# Patient Record
Sex: MALE | Race: WHITE | NOT HISPANIC OR LATINO | Employment: OTHER | URBAN - METROPOLITAN AREA
[De-identification: names, ages, dates, MRNs, and addresses within clinical notes are randomized per-mention and may not be internally consistent; named-entity substitution may affect disease eponyms.]

---

## 2023-05-27 ENCOUNTER — APPOINTMENT (EMERGENCY)
Dept: RADIOLOGY | Facility: HOSPITAL | Age: 28
End: 2023-05-27

## 2023-05-27 ENCOUNTER — APPOINTMENT (EMERGENCY)
Dept: CT IMAGING | Facility: HOSPITAL | Age: 28
End: 2023-05-27

## 2023-05-27 ENCOUNTER — HOSPITAL ENCOUNTER (EMERGENCY)
Facility: HOSPITAL | Age: 28
End: 2023-05-28
Attending: EMERGENCY MEDICINE

## 2023-05-27 DIAGNOSIS — R59.1 LYMPHADENOPATHY OF HEAD AND NECK: Primary | ICD-10-CM

## 2023-05-27 LAB
ANION GAP SERPL CALCULATED.3IONS-SCNC: 9 MMOL/L (ref 4–13)
APTT PPP: 37 SECONDS (ref 23–37)
BASOPHILS # BLD AUTO: 0.02 THOUSANDS/ÂΜL (ref 0–0.1)
BASOPHILS NFR BLD AUTO: 0 % (ref 0–1)
BUN SERPL-MCNC: 19 MG/DL (ref 5–25)
CALCIUM SERPL-MCNC: 9.9 MG/DL (ref 8.4–10.2)
CHLORIDE SERPL-SCNC: 99 MMOL/L (ref 96–108)
CK SERPL-CCNC: 274 U/L (ref 39–308)
CO2 SERPL-SCNC: 28 MMOL/L (ref 21–32)
CREAT SERPL-MCNC: 1.07 MG/DL (ref 0.6–1.3)
EOSINOPHIL # BLD AUTO: 0.04 THOUSAND/ÂΜL (ref 0–0.61)
EOSINOPHIL NFR BLD AUTO: 1 % (ref 0–6)
ERYTHROCYTE [DISTWIDTH] IN BLOOD BY AUTOMATED COUNT: 13.4 % (ref 11.6–15.1)
GFR SERPL CREATININE-BSD FRML MDRD: 92 ML/MIN/1.73SQ M
GLUCOSE SERPL-MCNC: 114 MG/DL (ref 65–140)
HCT VFR BLD AUTO: 36.8 % (ref 36.5–49.3)
HGB BLD-MCNC: 12.4 G/DL (ref 12–17)
IMM GRANULOCYTES # BLD AUTO: 0.02 THOUSAND/UL (ref 0–0.2)
IMM GRANULOCYTES NFR BLD AUTO: 0 % (ref 0–2)
INR PPP: 1.1 (ref 0.84–1.19)
LYMPHOCYTES # BLD AUTO: 1.93 THOUSANDS/ÂΜL (ref 0.6–4.47)
LYMPHOCYTES NFR BLD AUTO: 25 % (ref 14–44)
MCH RBC QN AUTO: 28.2 PG (ref 26.8–34.3)
MCHC RBC AUTO-ENTMCNC: 33.7 G/DL (ref 31.4–37.4)
MCV RBC AUTO: 84 FL (ref 82–98)
MONOCYTES # BLD AUTO: 0.85 THOUSAND/ÂΜL (ref 0.17–1.22)
MONOCYTES NFR BLD AUTO: 11 % (ref 4–12)
NEUTROPHILS # BLD AUTO: 4.99 THOUSANDS/ÂΜL (ref 1.85–7.62)
NEUTS SEG NFR BLD AUTO: 63 % (ref 43–75)
NRBC BLD AUTO-RTO: 0 /100 WBCS
PLATELET # BLD AUTO: 449 THOUSANDS/UL (ref 149–390)
PMV BLD AUTO: 8.9 FL (ref 8.9–12.7)
POTASSIUM SERPL-SCNC: 3.5 MMOL/L (ref 3.5–5.3)
PROTHROMBIN TIME: 14 SECONDS (ref 11.6–14.5)
RBC # BLD AUTO: 4.39 MILLION/UL (ref 3.88–5.62)
SODIUM SERPL-SCNC: 136 MMOL/L (ref 135–147)
WBC # BLD AUTO: 7.85 THOUSAND/UL (ref 4.31–10.16)

## 2023-05-27 RX ADMIN — IOHEXOL 100 ML: 350 INJECTION, SOLUTION INTRAVENOUS at 22:39

## 2023-05-28 ENCOUNTER — HOSPITAL ENCOUNTER (INPATIENT)
Facility: HOSPITAL | Age: 28
LOS: 2 days | Discharge: HOME | DRG: 681 | End: 2023-05-30
Attending: PSYCHIATRY & NEUROLOGY | Admitting: PSYCHIATRY & NEUROLOGY
Payer: COMMERCIAL

## 2023-05-28 ENCOUNTER — APPOINTMENT (INPATIENT)
Dept: RADIOLOGY | Facility: HOSPITAL | Age: 28
DRG: 681 | End: 2023-05-28
Payer: COMMERCIAL

## 2023-05-28 VITALS
WEIGHT: 190 LBS | DIASTOLIC BLOOD PRESSURE: 72 MMHG | TEMPERATURE: 98.9 F | SYSTOLIC BLOOD PRESSURE: 143 MMHG | HEART RATE: 88 BPM | BODY MASS INDEX: 26.6 KG/M2 | RESPIRATION RATE: 18 BRPM | OXYGEN SATURATION: 95 % | HEIGHT: 71 IN

## 2023-05-28 DIAGNOSIS — R22.1 NECK MASS: ICD-10-CM

## 2023-05-28 LAB
AMPHETAMINES SERPL QL SCN: NEGATIVE
ANION GAP SERPL CALCULATED.3IONS-SCNC: 2 MMOL/L (ref 4–13)
ATRIAL RATE: 107 BPM
B BURGDOR IGG+IGM SER-ACNC: <0.2 AI
BARBITURATES UR QL: NEGATIVE
BASOPHILS # BLD AUTO: 0.02 THOUSANDS/ÂΜL (ref 0–0.1)
BASOPHILS NFR BLD AUTO: 0 % (ref 0–1)
BENZODIAZ UR QL: NEGATIVE
BUN SERPL-MCNC: 14 MG/DL (ref 5–25)
CALCIUM SERPL-MCNC: 9.2 MG/DL (ref 8.3–10.1)
CHLORIDE SERPL-SCNC: 104 MMOL/L (ref 96–108)
CO2 SERPL-SCNC: 28 MMOL/L (ref 21–32)
COCAINE UR QL: NEGATIVE
CREAT SERPL-MCNC: 0.86 MG/DL (ref 0.6–1.3)
EOSINOPHIL # BLD AUTO: 0.08 THOUSAND/ÂΜL (ref 0–0.61)
EOSINOPHIL NFR BLD AUTO: 1 % (ref 0–6)
ERYTHROCYTE [DISTWIDTH] IN BLOOD BY AUTOMATED COUNT: 13.5 % (ref 11.6–15.1)
ETHANOL SERPL-MCNC: <3 MG/DL (ref 0–3)
GFR SERPL CREATININE-BSD FRML MDRD: 116 ML/MIN/1.73SQ M
GLUCOSE SERPL-MCNC: 122 MG/DL (ref 65–140)
HCT VFR BLD AUTO: 33.9 % (ref 36.5–49.3)
HGB BLD-MCNC: 11.1 G/DL (ref 12–17)
HIV 1+2 AB+HIV1 P24 AG SERPL QL IA: NORMAL
HIV 2 AB SERPL QL IA: NORMAL
HIV1 AB SERPL QL IA: NORMAL
HIV1 P24 AG SERPL QL IA: NORMAL
IMM GRANULOCYTES # BLD AUTO: 0.01 THOUSAND/UL (ref 0–0.2)
IMM GRANULOCYTES NFR BLD AUTO: 0 % (ref 0–2)
LYMPHOCYTES # BLD AUTO: 1.08 THOUSANDS/ÂΜL (ref 0.6–4.47)
LYMPHOCYTES NFR BLD AUTO: 17 % (ref 14–44)
MAGNESIUM SERPL-MCNC: 2 MG/DL (ref 1.6–2.6)
MCH RBC QN AUTO: 27.6 PG (ref 26.8–34.3)
MCHC RBC AUTO-ENTMCNC: 32.7 G/DL (ref 31.4–37.4)
MCV RBC AUTO: 84 FL (ref 82–98)
METHADONE UR QL: NEGATIVE
MONOCYTES # BLD AUTO: 0.85 THOUSAND/ÂΜL (ref 0.17–1.22)
MONOCYTES NFR BLD AUTO: 13 % (ref 4–12)
NEUTROPHILS # BLD AUTO: 4.29 THOUSANDS/ÂΜL (ref 1.85–7.62)
NEUTS SEG NFR BLD AUTO: 69 % (ref 43–75)
NRBC BLD AUTO-RTO: 0 /100 WBCS
OPIATES UR QL SCN: NEGATIVE
OXYCODONE+OXYMORPHONE UR QL SCN: NEGATIVE
P AXIS: 57 DEGREES
PCP UR QL: NEGATIVE
PHOSPHATE SERPL-MCNC: 3.2 MG/DL (ref 2.7–4.5)
PLATELET # BLD AUTO: 418 THOUSANDS/UL (ref 149–390)
PMV BLD AUTO: 8.8 FL (ref 8.9–12.7)
POTASSIUM SERPL-SCNC: 3.2 MMOL/L (ref 3.5–5.3)
PR INTERVAL: 158 MS
QRS AXIS: 75 DEGREES
QRSD INTERVAL: 94 MS
QT INTERVAL: 334 MS
QTC INTERVAL: 445 MS
RBC # BLD AUTO: 4.02 MILLION/UL (ref 3.88–5.62)
SODIUM SERPL-SCNC: 134 MMOL/L (ref 135–147)
T WAVE AXIS: 26 DEGREES
T4 FREE SERPL-MCNC: 0.81 NG/DL (ref 0.61–1.12)
THC UR QL: POSITIVE
TSH SERPL DL<=0.05 MIU/L-ACNC: 5.09 UIU/ML (ref 0.45–4.5)
VENTRICULAR RATE: 107 BPM
WBC # BLD AUTO: 6.33 THOUSAND/UL (ref 4.31–10.16)

## 2023-05-28 PROCEDURE — 87040 BLOOD CULTURE FOR BACTERIA: CPT | Performed by: STUDENT IN AN ORGANIZED HEALTH CARE EDUCATION/TRAINING PROGRAM

## 2023-05-28 PROCEDURE — 84439 ASSAY OF FREE THYROXINE: CPT | Performed by: STUDENT IN AN ORGANIZED HEALTH CARE EDUCATION/TRAINING PROGRAM

## 2023-05-28 PROCEDURE — 82077 ASSAY SPEC XCP UR&BREATH IA: CPT | Performed by: PSYCHIATRY & NEUROLOGY

## 2023-05-28 PROCEDURE — 80307 DRUG TEST PRSMV CHEM ANLYZR: CPT | Performed by: PSYCHIATRY & NEUROLOGY

## 2023-05-28 PROCEDURE — 83520 IMMUNOASSAY QUANT NOS NONAB: CPT | Performed by: STUDENT IN AN ORGANIZED HEALTH CARE EDUCATION/TRAINING PROGRAM

## 2023-05-28 PROCEDURE — 99291 CRITICAL CARE FIRST HOUR: CPT | Performed by: PSYCHIATRY & NEUROLOGY

## 2023-05-28 PROCEDURE — 74177 CT ABD & PELVIS W/CONTRAST: CPT

## 2023-05-28 PROCEDURE — 83735 ASSAY OF MAGNESIUM: CPT | Performed by: PSYCHIATRY & NEUROLOGY

## 2023-05-28 PROCEDURE — 80048 BASIC METABOLIC PNL TOTAL CA: CPT | Performed by: PSYCHIATRY & NEUROLOGY

## 2023-05-28 PROCEDURE — NC001 PR NO CHARGE: Performed by: INTERNAL MEDICINE

## 2023-05-28 PROCEDURE — 85025 COMPLETE CBC W/AUTO DIFF WBC: CPT | Performed by: PSYCHIATRY & NEUROLOGY

## 2023-05-28 PROCEDURE — 84443 ASSAY THYROID STIM HORMONE: CPT | Performed by: STUDENT IN AN ORGANIZED HEALTH CARE EDUCATION/TRAINING PROGRAM

## 2023-05-28 PROCEDURE — 87389 HIV-1 AG W/HIV-1&-2 AB AG IA: CPT | Performed by: STUDENT IN AN ORGANIZED HEALTH CARE EDUCATION/TRAINING PROGRAM

## 2023-05-28 PROCEDURE — 86618 LYME DISEASE ANTIBODY: CPT | Performed by: PSYCHIATRY & NEUROLOGY

## 2023-05-28 PROCEDURE — 84100 ASSAY OF PHOSPHORUS: CPT | Performed by: PSYCHIATRY & NEUROLOGY

## 2023-05-28 PROCEDURE — G1004 CDSM NDSC: HCPCS

## 2023-05-28 PROCEDURE — 31575 DIAGNOSTIC LARYNGOSCOPY: CPT | Performed by: OTOLARYNGOLOGY

## 2023-05-28 RX ORDER — POTASSIUM CHLORIDE 14.9 MG/ML
20 INJECTION INTRAVENOUS 2 TIMES DAILY
Status: DISCONTINUED | OUTPATIENT
Start: 2023-05-28 | End: 2023-05-28

## 2023-05-28 RX ORDER — POTASSIUM CHLORIDE 20 MEQ/1
40 TABLET, EXTENDED RELEASE ORAL ONCE
Status: COMPLETED | OUTPATIENT
Start: 2023-05-28 | End: 2023-05-28

## 2023-05-28 RX ORDER — DOXYCYCLINE HYCLATE 100 MG/1
100 CAPSULE ORAL EVERY 12 HOURS SCHEDULED
Status: DISCONTINUED | OUTPATIENT
Start: 2023-05-28 | End: 2023-05-30 | Stop reason: HOSPADM

## 2023-05-28 RX ORDER — DOXYCYCLINE HYCLATE 100 MG/1
100 CAPSULE ORAL ONCE
Status: COMPLETED | OUTPATIENT
Start: 2023-05-28 | End: 2023-05-28

## 2023-05-28 RX ORDER — CHLORHEXIDINE GLUCONATE 0.12 MG/ML
15 RINSE ORAL EVERY 12 HOURS SCHEDULED
Status: DISCONTINUED | OUTPATIENT
Start: 2023-05-28 | End: 2023-05-28

## 2023-05-28 RX ORDER — POTASSIUM CHLORIDE 14.9 MG/ML
20 INJECTION INTRAVENOUS ONCE
Status: COMPLETED | OUTPATIENT
Start: 2023-05-28 | End: 2023-05-28

## 2023-05-28 RX ADMIN — DOXYCYCLINE 100 MG: 100 CAPSULE ORAL at 04:14

## 2023-05-28 RX ADMIN — POTASSIUM CHLORIDE 20 MEQ: 14.9 INJECTION, SOLUTION INTRAVENOUS at 08:23

## 2023-05-28 RX ADMIN — POTASSIUM CHLORIDE 20 MEQ: 14.9 INJECTION, SOLUTION INTRAVENOUS at 04:42

## 2023-05-28 RX ADMIN — IOHEXOL 85 ML: 350 INJECTION, SOLUTION INTRAVENOUS at 23:45

## 2023-05-28 RX ADMIN — DOXYCYCLINE 100 MG: 100 CAPSULE ORAL at 20:22

## 2023-05-28 RX ADMIN — CHLORHEXIDINE GLUCONATE 15 ML: 1.2 SOLUTION ORAL at 08:22

## 2023-05-28 RX ADMIN — POTASSIUM CHLORIDE 40 MEQ: 1500 TABLET, EXTENDED RELEASE ORAL at 15:15

## 2023-05-28 NOTE — ASSESSMENT & PLAN NOTE
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  • CT Abd/pelvis: 2 enlarged lymph nodes along aortocaval space, left external iliac chain; 1 mm right pulmonary nodule  • HIV negative, Lyme negative  • Leukemia and lymphoma flow cytometry sent     Plan  • S/p IR core needle biopsy on Tuesday 5/30; patient elects to seek outpatient follow up in [address] at the [facility] there  Patient advised to follow up with hematology/oncology there and establish care with primary care doctor there  Patient advised to return to [facility] if desired for treatment  • Lyme serologies negative - although doubt these will be positive given too early in clinical course for Ab to form  Typical doxycycline course is 14-21 days  Would doxycyline continue for 14 day course given low probability of Lyme, Rx sent on discharge for 14 day course ending 6/13/23     • Follow up Bartonella, HSV, IgE, Tryptase

## 2023-05-28 NOTE — ED NOTES
Spoke with Brant Chua to give report at [facility]       Fortunato Murguia RN  05/28/23 8552

## 2023-05-28 NOTE — H&P
H&P Exam - Critical Care   Ángela Boyle 25 y o  male MRN: [id number]  Unit/Bed#: [facility]U 204-01 Encounter: [id number]      -------------------------------------------------------------------------------------------------------------  Chief Complaint: Left sided neck swelling    History of Present Illness     Ángela Boyle is a 25 y o  male with no significant medical history presents with the complaint of left sided neck swelling  The pt states that he was camping with his mother and wife yesterday and his family noticed he had neck swelling  This was not there prior to yesterday and this the first time he has experienced this  He denies any symptoms such as SOB, voice change, or difficulty swallowing  He has no allergies  He denies bug bites  CT soft tissue neck shows Enlarged left cervical lymph node conglomerate as detailed above measuring up to 15 5 cm in craniocaudal dimension and with significant mass effect upon the surrounding soft tissue structures consistent with neoplasm, likely lymphoma  Pt is transferred to [facility] for airway watch and ENT consult  History obtained from the patient   -------------------------------------------------------------------------------------------------------------  Assessment and Plan:    Neuro:   • No acute issues       CV:   • No acute issues      Pulm:  • No acute issues     GI:   • No acute issues         :   • No acute issues       F/E/N:   • No acute issues   • NPO- perhaps ent will biposy?        Heme/Onc:   • Neck mass  o Concern for lymphoma based on CT scan       Endo:   • No acute issues   o Tsh ordered       ID:   • Was pulling ticks off of his dogs today   o Doxy prophylaxis  • Neck mass  o HIV testing  o Blood cultures        MSK/Skin:   • Neck mass  o Airway watch       Disposition: Admit to Stepdown Level 1  Code Status: Level 1 - Full Code  --------------------------------------------------------------------------------------------------------------  Review of Systems   Constitutional: Negative for chills and fever  HENT: Negative for congestion, ear pain, rhinorrhea and sore throat  Eyes: Negative for pain  Respiratory: Negative for apnea, cough, choking, chest tightness, shortness of breath, wheezing and stridor  Cardiovascular: Negative for chest pain, palpitations and leg swelling  Gastrointestinal: Negative for abdominal pain, constipation, diarrhea, nausea and vomiting  Genitourinary: Negative for hematuria  Musculoskeletal: Negative for arthralgias and back pain  Skin: Negative for rash and wound  Neurological: Negative for dizziness  Psychiatric/Behavioral: Negative for agitation and hallucinations  All other systems reviewed and are negative  A 12-point, complete review of systems was reviewed and negative except as stated above     Physical Exam  Vitals reviewed  Constitutional:       General: He is not in acute distress  Appearance: He is well-developed  HENT:      Head: Normocephalic and atraumatic  Right Ear: External ear normal       Left Ear: External ear normal       Nose: Nose normal  No congestion or rhinorrhea  Mouth/Throat:      Mouth: Mucous membranes are moist       Pharynx: No oropharyngeal exudate or posterior oropharyngeal erythema  Eyes:      General:         Right eye: No discharge  Left eye: No discharge  Extraocular Movements: Extraocular movements intact  Conjunctiva/sclera: Conjunctivae normal       Pupils: Pupils are equal, round, and reactive to light  Neck:      Comments: Left neck mass non-tender  Cardiovascular:      Rate and Rhythm: Normal rate and regular rhythm  Pulses: Normal pulses  Heart sounds: Normal heart sounds  Pulmonary:      Effort: Pulmonary effort is normal  No respiratory distress        Breath sounds: Normal breath sounds  No wheezing or rales  Abdominal:      Palpations: Abdomen is soft  Tenderness: There is no abdominal tenderness  Musculoskeletal:         General: No tenderness  Normal range of motion  Cervical back: Normal range of motion and neck supple  No rigidity or tenderness  Skin:     General: Skin is warm  Findings: No erythema or rash  Neurological:      General: No focal deficit present  Mental Status: He is alert and oriented to person, place, and time  Cranial Nerves: No cranial nerve deficit  Sensory: No sensory deficit  Motor: No weakness  Coordination: Coordination normal    Psychiatric:         Mood and Affect: Mood normal          --------------------------------------------------------------------------------------------------------------  Vitals:   Vitals:    05/28/23 0235   Temp: 97 9 °F (36 6 °C)   TempSrc: Oral     Temp  Min: 97 9 °F (36 6 °C)  Max: 98 9 °F (37 2 °C)        There is no height or weight on file to calculate BMI  PAP:      Laboratory and Diagnostics:  Results from last 7 days   Lab Units 05/27/23 2120   EOS PCT % 1   HEMATOCRIT % 36 8   HEMOGLOBIN g/dL 12 4   MONOS PCT % 11   NEUTROS PCT % 63   PLATELETS Thousands/uL 449*   WBC Thousand/uL 7 85     Results from last 7 days   Lab Units 05/27/23 2120   ANION GAP mmol/L 9   BUN mg/dL 19   CALCIUM mg/dL 9 9   CHLORIDE mmol/L 99   CO2 mmol/L 28   CREATININE mg/dL 1 07   GLUCOSE RANDOM mg/dL 114   POTASSIUM mmol/L 3 5   SODIUM mmol/L 136          Results from last 7 days   Lab Units 05/27/23 2120   INR  1 10   PTT seconds 37              ABG:    VBG:          Micro:        EKG:   Imaging: I have personally reviewed pertinent reports  Historical Information   History reviewed  No pertinent past medical history  History reviewed  No pertinent surgical history    Social History   Social History     Substance and Sexual Activity   Alcohol Use Not Currently     Social History     Substance "and Sexual Activity   Drug Use Not Currently     Social History     Tobacco Use   Smoking Status Never   Smokeless Tobacco Never     Exercise History:   Family History:   History reviewed  No pertinent family history  I have reviewed this patient's family history and commented on sigificant items within the HPI      Medications:  Current Facility-Administered Medications   Medication Dose Route Frequency   • chlorhexidine (PERIDEX) 0 12 % oral rinse 15 mL  15 mL Mouth/Throat Q12H [facility]     Home medications:  None     Allergies: Allergies   Allergen Reactions   • Tylenol [Acetaminophen] Swelling     ------------------------------------------------------------------------------------------------------------  Advance Directive and Living Will:      Power of :    POLST:    ------------------------------------------------------------------------------------------------------------  Anticipated Length of Stay is > 2 midnights        [address], DO        Portions of the record may have been created with voice recognition software  Occasional wrong word or \"sound a like\" substitutions may have occurred due to the inherent limitations of voice recognition software    Read the chart carefully and recognize, using context, where substitutions have occurred  "

## 2023-05-28 NOTE — ED PROVIDER NOTES
History  Chief Complaint   Patient presents with   • Neck Swelling     Per pt wife pt started with neck swelling on the left side of his neck less then an hour ago, that has progressivly gotten worse over the past hour  Denies difficulty breathing or swallowing  This is a 25y o   year old male  Without significant past medical history,   No prior surgeries  Denies the use of cigarettes, alcohol  + THC  That presents to the Emergency Department today with a chief complaint of sudden painless swelling L lateral neck  No trauma, no falls, no injuries of any kind  No recent illness, fever, cold symptoms  History obtained directly from the patient  History provided by:  Patient   used: No        None       History reviewed  No pertinent past medical history  History reviewed  No pertinent surgical history  History reviewed  No pertinent family history  I have reviewed and agree with the history as documented  E-Cigarette/Vaping   • E-Cigarette Use Never User      E-Cigarette/Vaping Substances   • Nicotine No    • THC No    • CBD No    • Flavoring No    • Other No    • Unknown No      Social History     Tobacco Use   • Smoking status: Never   • Smokeless tobacco: Never   Vaping Use   • Vaping Use: Never used   Substance Use Topics   • Alcohol use: Not Currently   • Drug use: Yes     Frequency: 1 0 times per week     Types: Marijuana       Review of Systems   Constitutional: Negative for chills and fever  HENT: Negative for ear pain and sore throat  Eyes: Negative for pain and visual disturbance  Respiratory: Negative for cough and shortness of breath  Cardiovascular: Negative for chest pain and palpitations  Gastrointestinal: Negative for abdominal pain and vomiting  Genitourinary: Negative for dysuria and hematuria  Musculoskeletal: Negative for arthralgias and back pain  Skin: Negative for color change and rash     Neurological: Negative for seizures and syncope  All other systems reviewed and are negative  Physical Exam  Physical Exam  Vitals (120) and nursing note reviewed  Constitutional:       General: He is not in acute distress  Appearance: Normal appearance  He is well-developed and normal weight  HENT:      Head: Normocephalic and atraumatic  Right Ear: Ear canal and external ear normal       Left Ear: Ear canal and external ear normal       Nose: Nose normal       Mouth/Throat:      Mouth: Mucous membranes are moist    Eyes:      Conjunctiva/sclera: Conjunctivae normal       Pupils: Pupils are equal, round, and reactive to light  Neck:      Comments: Soft swelling L lateral neck  From post ear all way to lower and anterior neck  Slight crepitus palpable  NON tender  No change in color of skin, no change in temperature  Cardiovascular:      Rate and Rhythm: Regular rhythm  Tachycardia present  Pulses: Normal pulses  Heart sounds: Normal heart sounds  No murmur heard  Pulmonary:      Effort: Pulmonary effort is normal  No respiratory distress  Breath sounds: Normal breath sounds  Abdominal:      Palpations: Abdomen is soft  Tenderness: There is no abdominal tenderness  Musculoskeletal:         General: No swelling  Cervical back: Neck supple  No rigidity or tenderness  Lymphadenopathy:      Cervical: No cervical adenopathy  Skin:     General: Skin is warm and dry  Capillary Refill: Capillary refill takes less than 2 seconds  Neurological:      General: No focal deficit present  Mental Status: He is alert and oriented to person, place, and time  Psychiatric:         Mood and Affect: Mood normal          Thought Content:  Thought content normal          Judgment: Judgment normal          Vital Signs  ED Triage Vitals [05/27/23 2056]   Temperature Pulse Respirations Blood Pressure SpO2   98 9 °F (37 2 °C) (!) 120 19 139/71 99 %      Temp Source Heart Rate Source Patient Position - Orthostatic VS BP Location FiO2 (%)   Oral Monitor Sitting Left arm --      Pain Score       No Pain           Vitals:    05/27/23 2056 05/27/23 2300 05/28/23 0118   BP: 139/71 129/86 143/72   Pulse: (!) 120 88 88   Patient Position - Orthostatic VS: Sitting Sitting Sitting         Visual Acuity      ED Medications  Medications   iohexol (OMNIPAQUE) 350 MG/ML injection (SINGLE-DOSE) 100 mL (100 mL Intravenous Given 5/27/23 2239)       Diagnostic Studies  Results Reviewed     Procedure Component Value Units Date/Time    CK [[id number]]  (Normal) Collected: 05/27/23 2120    Lab Status: Final result Specimen: Blood from Arm, Right Updated: 05/27/23 2201     Total  U/L     Basic metabolic panel [[id number]] Collected: 05/27/23 2120    Lab Status: Final result Specimen: Blood from Arm, Right Updated: 05/27/23 2145     Sodium 136 mmol/L      Potassium 3 5 mmol/L      Chloride 99 mmol/L      CO2 28 mmol/L      ANION GAP 9 mmol/L      BUN 19 mg/dL      Creatinine 1 07 mg/dL      Glucose 114 mg/dL      Calcium 9 9 mg/dL      eGFR 92 ml/min/1 73sq m     Narrative:      Ishmael guidelines for Chronic Kidney Disease (CKD):   •  Stage 1 with normal or high GFR (GFR > 90 mL/min/1 73 square meters)  •  Stage 2 Mild CKD (GFR = 60-89 mL/min/1 73 square meters)  •  Stage 3A Moderate CKD (GFR = 45-59 mL/min/1 73 square meters)  •  Stage 3B Moderate CKD (GFR = 30-44 mL/min/1 73 square meters)  •  Stage 4 Severe CKD (GFR = 15-29 mL/min/1 73 square meters)  •  Stage 5 End Stage CKD (GFR <15 mL/min/1 73 square meters)  Note: GFR calculation is accurate only with a steady state creatinine    Protime-INR [[id number]]  (Normal) Collected: 05/27/23 2120    Lab Status: Final result Specimen: Blood from Arm, Right Updated: 05/27/23 2141     Protime 14 0 seconds      INR 1 10    APTT [[id number]]  (Normal) Collected: 05/27/23 2120    Lab Status: Final result Specimen: Blood from Arm, Right Updated: 05/27/23 2141 PTT 37 seconds     CBC and differential [[id number]]  (Abnormal) Collected: 05/27/23 2120    Lab Status: Final result Specimen: Blood from Arm, Right Updated: 05/27/23 2129     WBC 7 85 Thousand/uL      RBC 4 39 Million/uL      Hemoglobin 12 4 g/dL      Hematocrit 36 8 %      MCV 84 fL      MCH 28 2 pg      MCHC 33 7 g/dL      RDW 13 4 %      MPV 8 9 fL      Platelets 708 Thousands/uL      nRBC 0 /100 WBCs      Neutrophils Relative 63 %      Immat GRANS % 0 %      Lymphocytes Relative 25 %      Monocytes Relative 11 %      Eosinophils Relative 1 %      Basophils Relative 0 %      Neutrophils Absolute 4 99 Thousands/µL      Immature Grans Absolute 0 02 Thousand/uL      Lymphocytes Absolute 1 93 Thousands/µL      Monocytes Absolute 0 85 Thousand/µL      Eosinophils Absolute 0 04 Thousand/µL      Basophils Absolute 0 02 Thousands/µL                  CT soft tissue neck with contrast   Final Result by Merced Mace MD (05/27 2330)      Enlarged left cervical lymph node conglomerate as detailed above measuring up to 15 5 cm in craniocaudal dimension and with significant mass effect upon the surrounding soft tissue structures consistent with neoplasm, likely lymphoma  Tissue sampling    recommended         Workstation performed: [id number]         CT chest with contrast   Final Result by Merced Mace MD (05/27 2339)      Mild upper mediastinal lymphadenopathy likely related to patient's left cervical chain neoplasm  Otherwise no evidence of acute intrathoracic pathology  Cervical soft tissue findings described separately in CT cervical spine  Workstation performed: [id number]         CT chest without contrast   Final Result by Merced Mace MD (05/27 2235)      7 cm soft tissue mass deep to the left sternocleidomastoid muscle within the left supraclavicular region  This finding should be followed further with a contrast-enhanced examination  The study was marked in [facility] for immediate notification  Workstation performed: [id number]         CT soft tissue neck wo contrast   Final Result by Duarte Lemus MD (05/27 2220)      Marked bulky left cervical lymphadenopathy  The findings are nonspecific with differential considerations including malignancy/metastatic disease, lymphoma, infection, among other etiologies  Consider tissue sampling for further evaluation  The study was mildly limited by the lack of intravenous contrast       Mild displacement of the airway to the right secondary to left-sided bulky lymphadenopathy  However, there is no significant airway narrowing  Workstation performed: [id number]         XR chest 1 view portable   ED Interpretation by Destiny Pimentel MD (05/27 2139)   Radiology studies have been independently visualized by me  Preliminary interpretation: No ptx, deviation of airway to R   No free fluid  All radiology studies will be officially read by the radiologist and any discrepancies flagged and follow through the discrepancy management process to make the patient aware of significant results  Final Result by Geremias Ochoa MD (05/28 1331)      No acute cardiopulmonary disease  Workstation performed: [id number]                    Procedures  Procedures         ED Course  ED Course as of 05/29/23 0518   Sat May 27, 2023   2140 WBC: 7 85   2140 Hemoglobin: 12 4   2140 HCT: 36 8   2154 Sodium: 136   2154 Potassium: 3 5   2154 BUN: 19   2154 Creatinine: 1 07                                             Medical Decision Making  Laboratory results revealed a   WBC   Normal  HH   Normal  PLT   Normal  Kidney Function  Normal  Electrolytes  Normal       Lymphadenopathy of head and neck: acute illness or injury  Amount and/or Complexity of Data Reviewed  Labs: ordered  Decision-making details documented in ED Course  Radiology: ordered and independent interpretation performed    Discussion of management or test interpretation with external provider(s): Emergency consultations with ENT, Critical care attending  Discussion of management and transfer  Risk  Prescription drug management  Decision regarding hospitalization  Disposition  Final diagnoses:   Lymphadenopathy of head and neck     Time reflects when diagnosis was documented in both MDM as applicable and the Disposition within this note     Time User Action Codes Description Comment    5/27/2023 11:26 PM Gato Garcia Add [R59 1] Lymphadenopathy of head and neck       ED Disposition     ED Disposition   Transfer to Another Facility-In Network    Condition   --    Date/Time   Sat May 27, 2023 11:26 PM    Comment   López Arias should be transferred out to [facility] (surgical)           MD Documentation    Jack Murdock Most Recent Value   Patient Condition The patient has been stabilized such that within reasonable medical probability, no material deterioration of the patient condition or the condition of the unborn child(robyn) is likely to result from the transfer   Reason for Transfer Level of Care needed not available at this facility   Benefits of Transfer Specialized equipment and/or services available at the receiving facility (Include comment)________________________   Risks of Transfer Potential for delay in receiving treatment, Increased discomfort during transfer, Possible worsening of condition or death during transfer, Potential deterioration of medical condition, Loss of IV   Accepting Physician [address] Name, Tay Pham   Sending MD ALHAJI De La Fuente   Provider Certification General risk, such as traffic hazards, adverse weather conditions, rough terrain or turbulence, possible failure of equipment (including vehicle or aircraft), or consequences of actions of persons outside the control of the transport personnel, Risk of worsening condition, The possibility of a transport vehicle being unavailable, Unanticipated needs of medical equipment and personnel during transport, Consent was not obtained as patient is committed to psychiatric facility and transfer is mandated, The patient is stable for psychiatric transfer because they are medically stable, and is protected from harming him/herself or others during transport      RN Documentation    [address] Name, Jasmin[address]linuschristine 41  SLB   Medications Reviewed with Next Provider of Service No   Transport Mode Ambulance   Level of Care Advanced life support      Follow-up Information    None         There are no discharge medications for this patient  No discharge procedures on file      PDMP Review     None          ED Provider  Electronically Signed by           Lula Alicia MD  05/29/23 1957

## 2023-05-28 NOTE — ASSESSMENT & PLAN NOTE
· Rapidly enlarging left mass, admitted to [facility] for airway watch   · No history of B symptoms   · No axillary or groin lymphadenopathy   · No fevers, recent illness, pharyngitis       Workup:  · 5/27 CT neck w  Con: Enlarged L cervical lymph node, 15 5 cm, significant mass effect upon surrounding soft tissue structures consistent with neoplasm, likely lymphoma    · 5/27 CT chest w con: Mild upper mediastinal lymphadenopathy likely related to patient's left cervical chain neoplasm  · HIV pending     Plan  · IR core needle biopsy - Tuesday   · Would not recommend d/c home until biopsy given history of rapid progression   · Follow up HIV   · Follow up Lyme serologies - although doubt these will be positive given too early in clinical course for Ab to form   · Follow up Bartonella   · Follow up CT abd pelvis ordered

## 2023-05-28 NOTE — PLAN OF CARE
Problem: PAIN - ADULT  Goal: Verbalizes/displays adequate comfort level or baseline comfort level  Description: Interventions:  - Encourage patient to monitor pain and request assistance  - Assess pain using appropriate pain scale  - Administer analgesics based on type and severity of pain and evaluate response  - Implement non-pharmacological measures as appropriate and evaluate response  - Consider cultural and social influences on pain and pain management  - Notify physician/advanced practitioner if interventions unsuccessful or patient reports new pain  Outcome: Progressing     Problem: INFECTION - ADULT  Goal: Absence of fever/infection during neutropenic period  Description: INTERVENTIONS:  - Monitor WBC    Outcome: Progressing     Problem: SAFETY ADULT  Goal: Patient will remain free of falls  Description: INTERVENTIONS:  - Educate patient/family on patient safety including physical limitations  - Instruct patient to call for assistance with activity   - Consult OT/PT to assist with strengthening/mobility   - Keep Call bell within reach  - Keep bed low and locked with side rails adjusted as appropriate  - Keep care items and personal belongings within reach  - Initiate and maintain comfort rounds  - Make Fall Risk Sign visible to staff  - Offer Toileting every  Hours, in advance of need  - Initiate/Maintain alarm  - Obtain necessary fall risk management equipment:   - Apply yellow socks and bracelet for high fall risk patients  - Consider moving patient to room near nurses station  Outcome: Progressing

## 2023-05-28 NOTE — ED NOTES
Transfer to [facility]  Dr Lenora Perez accepting  [facility] 0100  time  Phone to call report RICARDO Sanz  05/28/23 2049

## 2023-05-28 NOTE — TELEMEDICINE
e-Consult (IPC)  - Interventional Radiology  Ruth Bernard 25 y o  male MRN: [id number]  Unit/Bed#: [facility] 204-01 Encounter: [id number]          Interventional Radiology has been consulted to evaluate Ruth Bernard    We were consulted by ICU service concerning this patient with left neck mass  Inpatient Consult to IR  Consult performed by: Johnny Walker MD  Consult ordered by: Renetta Villar DO        05/28/23    Assessment/Recommendation:   Patient is a 25year-old male with no significant past medical history, now presented to the ED due to left neck swelling  CT of the neck showed multiple enlarged lymph nodes present at the left neck  Patient has no issues with respiratory compromise as per medical records  Ultrasound-guided biopsy will be planned for Tuesday 5/30, however, can also be arranged for outpatient if patient were to be discharged  Patient does not have to be NPO for procedure  5-10 minutes, >50% of the total time devoted to medical consultative verbal/EMR discussion between providers  Written report will be generated in the EMR  Thank you for allowing Interventional Radiology to participate in the care of Ruth Bernard  Please don't hesitate to call or TigerText us with any questions       Johnny Walker MD

## 2023-05-28 NOTE — PROGRESS NOTES
"    INTERNAL MEDICINE RESIDENCY TRANSFER ACCEPTANCE NOTE     Name: Branden Dockery   Age & Sex: 25 y o  male   MRN: [id number]  Unit/Bed#: [facility] 204-01   Encounter: [id number]  Hospital Stay Days: 0    Accepting team: SOD Team C   Code Status: Level 1 - Full Code  Disposition: Patient requires Med/Surg    ASSESSMENT/PLAN     Principal Problem:    Neck mass      * Neck mass  Assessment & Plan  • Rapidly enlarging left mass, originally admitted to [facility] for airway watch   • No history of B symptoms   • No axillary or groin lymphadenopathy   • No fevers, recent illness, pharyngitis   • Transferred to Med Surg 5/28        Workup:  • 5/27 CT neck w  Con: Enlarged L cervical lymph node, 15 5 cm, significant mass effect upon surrounding soft tissue structures consistent with neoplasm, likely lymphoma  • 5/27 CT chest w con: Mild upper mediastinal lymphadenopathy likely related to patient's left cervical chain neoplasm  • HIV pending      Plan  • IR core needle biopsy - Tuesday   • Would not recommend d/c home until biopsy given history of rapid progression   • Follow up HIV   • Follow up Lyme serologies - although doubt these will be positive given too early in clinical course for Ab to form   • Follow up Bartonella   • Follow up CT abd pelvis ordered       VTE Pharmacologic Prophylaxis: Reason for no pharmacologic prophylaxis Low Risk  VTE Mechanical Prophylaxis: sequential compression device    HOSPITAL COURSE     HPI and Hospital course as per Dr Holden Po: Pool Murphy is a 25 y o  male with no significant medical history presents with the complaint of left sided neck swelling  The pt states that he was camping with his mother and wife yesterday and his family noticed he had neck swelling  This was not there prior to yesterday and this the first time he has experienced this  He denies any symptoms such as SOB, voice change, or difficulty swallowing  He has no allergies  He denies bug bites   CT soft tissue neck " "shows Enlarged left cervical lymph node conglomerate as detailed above measuring up to 15 5 cm in craniocaudal dimension and with significant mass effect upon the surrounding soft tissue structures consistent with neoplasm, likely lymphoma  He was sent to [facility] for airway watch  hes bring treated empirically with doxy bc of hx of tick exposure (dogs had ticks on them, but patient did not have tick bite) Patient denies B symptoms  no fever chills fatigue  no white count  ENT saw him  needs IR biopsy  will be done in patient tuesday  dont reccomend sending him home bc the mass was so rapidly enlarging when he presented  right now protecting airway and eating normal diet  \"    SUBJECTIVE     Patient was seen and examined at bedside  Overall the patient was feeling well  He continues to have the large swelling on his left anterior neck, however he reports that it has been unchanged since the day prior  He was told that the IR biopsy of his neck mass is planned for Tuesday  At that time he reported that he would like to go home as he does not want to stay at the hospital over the next couple of days  He was willing to sign out AMA  After a lengthy discussion regarding the risks and benefits of staying versus leaving, including the possibility of the neck mass growing further leading to respiratory compromise and death, the patient was agreeable to staying until the biopsy on Tuesday  He plans to establish care and follow-up at [facility] in [place] as it is close to his house  He also requested to speak to case management regarding obtaining medical insurance      OBJECTIVE     Vitals:    05/28/23 0533 05/28/23 0538 05/28/23 0710 05/28/23 1130   BP:  140/83 140/75 145/77   BP Location:  Right arm Right arm    Pulse:  80 98 82   Resp:  18 16    Temp:  97 9 °F (36 6 °C) 97 9 °F (36 6 °C) 98 2 °F (36 8 °C)   TempSrc:  Oral Oral Oral   SpO2:   98% 97%   Weight: 82 kg (180 lb 12 4 oz)      Height:  5' 11\" (1 803 " m)       I/O last 24 hours: In: 130 [I V :30; IV Piggyback:100]  Out: -     Physical Exam  Constitutional:       Appearance: He is well-developed  HENT:      Head: Normocephalic and atraumatic  Nose: Nose normal    Eyes:      General:         Right eye: No discharge  Left eye: No discharge  Conjunctiva/sclera: Conjunctivae normal       Pupils: Pupils are equal, round, and reactive to light  Neck:      Thyroid: No thyromegaly  Comments: Large left neck mass  Cardiovascular:      Rate and Rhythm: Normal rate and regular rhythm  Heart sounds: Normal heart sounds  No murmur heard  No friction rub  No gallop  Pulmonary:      Effort: Pulmonary effort is normal  No respiratory distress  Breath sounds: Normal breath sounds  No stridor  No wheezing or rales  Chest:      Chest wall: No tenderness  Abdominal:      General: Bowel sounds are normal  There is no distension  Palpations: Abdomen is soft  There is no mass  Tenderness: There is no abdominal tenderness  There is no guarding or rebound  Musculoskeletal:         General: No tenderness or deformity  Right lower leg: No edema  Left lower leg: No edema  Lymphadenopathy:      Cervical: No cervical adenopathy  Skin:     General: Skin is warm and dry  Neurological:      Mental Status: He is alert and oriented to person, place, and time  Psychiatric:         Mood and Affect: Mood normal          Behavior: Behavior normal          Thought Content: Thought content normal          Judgment: Judgment normal        LABORATORY DATA     Labs: I have personally reviewed pertinent reports      Results from last 7 days   Lab Units 05/28/23 0335 05/27/23 2120   EOS PCT % 1 1   HEMATOCRIT % 33 9* 36 8   HEMOGLOBIN g/dL 11 1* 12 4   MONOS PCT % 13* 11   NEUTROS PCT % 69 63   PLATELETS Thousands/uL 418* 449*   WBC Thousand/uL 6 33 7 85      Results from last 7 days   Lab Units 05/28/23 0335 05/27/23 2120   BUN "mg/dL 14 19   CALCIUM mg/dL 9 2 9 9   CHLORIDE mmol/L 104 99   CO2 mmol/L 28 28   CREATININE mg/dL 0 86 1 07   POTASSIUM mmol/L 3 2* 3 5     Results from last 7 days   Lab Units 05/28/23  0335   MAGNESIUM mg/dL 2 0     Results from last 7 days   Lab Units 05/28/23  0335   PHOSPHORUS mg/dL 3 2      Results from last 7 days   Lab Units 05/27/23  2120   INR  1 10   PTT seconds 37             Micro:  Lab Results   Component Value Date    BLOODCX Received in Microbiology Lab  Culture in Progress  05/28/2023    BLOODCX Received in Microbiology Lab  Culture in Progress  05/28/2023     IMAGING & DIAGNOSTIC TESTING     Imaging: I have personally reviewed pertinent reports  No results found  EKG, Pathology, and Other Studies: I have personally reviewed pertinent reports  ALLERGIES     Allergies   Allergen Reactions   • Tylenol [Acetaminophen] Swelling     MEDICATIONS     Current Facility-Administered Medications   Medication Dose Route Frequency Provider Last Rate   • doxycycline hyclate  100 mg Oral Q12H [facility] Karina Edwards DO     • potassium chloride  40 mEq Oral Once Wilma Valdez MD               Portions of the record may have been created with voice recognition software  Occasional wrong word or \"sound a like\" substitutions may have occurred due to the inherent limitations of voice recognition software    Read the chart carefully and recognize, using context, where substitutions have occurred     ==  Wilma Valdez MD  [address]  Internal Medicine Residency PGY-3  "

## 2023-05-28 NOTE — CONSULTS
"OTOLARYNGOLOGY CONSULT    Date of Service: 5/28/2023    Reason for consult: neck swelling     ASSESSMENT/PLAN:  Bhumika Dasilva is a 25 y o  male who we are consulted on for neck swelling    -ddx: infection vs reactive response vs malignancy  -complete doxycycline regimen for prophylactic coverage  -FU BC, HIV results  -pt has no airway/swallowing concerns at this time, may be dc home (timing per primary)  -no acute inpatient interventions per ENT  -IR biopsy for definitive dx (may be done outpatient)  -ENT outpatient FU  Pt lives in [place] and expressed desires to FU w/ [facility] upon dc    HPI  Patient is a 75-year-old male with no significant past medical history who presented to [facility] ED on 5/27 due to a few hr hx of L neck swelling  Pt was hiking w/ his wife and dog 5hr prior to presenting, the hike was uneventful and pt did not remember any insect bites and/or encounter w/ plants/leaves etc  Pt remembered seeing a few ticks on his dog, which he plucked off, but does not believe any tick got on him  After the hike, pt's wife noticed acute onset of swelling in L neck (no swelling, symmetrical neck before hike)  Pt denied any recent illnesses, has no F/C, night sweats, or recent wt changes  Pt's only known family cancer hx is paternal grandfather w/ some type of \"bone cancer  \" Pt does not smoke or drink but uses medical marijuana daily for anxiety  Pt denied dysphagia, odynophagia, dyspnea, voice changes, or neck tenderness  Pt is UTD w/ vaccinations  Pt was started on doxycycline in ED and had HIV, TSH (5 1), UDS (+ for THC), and BC x2 sent  WBC is 6 33 (7 85)      CT soft tissue neck w con (5/27/23): 15 5 x 9 5 x 7 2cm L cervical LN (level II-IV) extending from mastoid tip to sternum    CURRENT HOSPITAL MEDICATIONS  Current Facility-Administered Medications   Medication Dose Route Frequency Provider Last Rate Last Admin   • chlorhexidine (PERIDEX) 0 12 % oral rinse 15 mL  15 mL Mouth/Throat Q12H [facility] " "Aldair Call, DO   15 mL at 05/28/23 2700   • doxycycline hyclate (VIBRAMYCIN) capsule 100 mg  100 mg Oral Q12H Nate Aaron MD       • potassium chloride 20 mEq IVPB (premix)  20 mEq Intravenous Once Aníbal Jarrell MD 50 mL/hr at 05/28/23 0823 20 mEq at 05/28/23 3362       REVIEW OF SYSTEMS  As above    HISTORIES  PMH:  History reviewed  No pertinent past medical history  PSH:  History reviewed  No pertinent surgical history  SocHx:  Social History     Tobacco Use   • Smoking status: Never   • Smokeless tobacco: Never   Vaping Use   • Vaping Use: Never used   Substance Use Topics   • Alcohol use: Not Currently   • Drug use: Yes     Frequency: 1 0 times per week     Types: Marijuana       FH:  History reviewed  No pertinent family history  ALLERGIES:  Allergies   Allergen Reactions   • Tylenol [Acetaminophen] Swelling       PHYSICAL EXAM  Visit Vitals  /75 (BP Location: Right arm)   Pulse 98   Temp 97 9 °F (36 6 °C) (Oral)   Resp 16   Ht 5' 11\" (1 803 m)   Wt 82 kg (180 lb 12 4 oz)   SpO2 98%   BMI 25 21 kg/m²   Smoking Status Never   BSA 2 02 m²       General: NAD, AOx4  Eyes:  EOMI, PERRL  Ears: External ears normal in appearance  Nose: External appearance normal  Oral cavity:  No trismus, no mass/lesions, 1+ tonsils, no erythema  Neck: Trachea is midline; no thyroid nodules, large mass/swelling following L SCM visualized, not TTP, not mobile  Skin:  no external signs of bites/injuries/infection/inflammation/irritation, 2-3 unremarkable acrochordons over the L neck mass/swelling  Neuro: Motor and sensory grossly intact  Face symmetrical, no obvious cranial nerve palsies,motor and sensory grossly intact, no focal deficits     Lungs:  Normal work of breathing, symmetrical chest expansion  Vascular: Well perfused      LABORATORY  Reviewed    PROCEDURES  FIBEROPTIC LARYNGOSCOPY:  Procedure:Fiberoptic nasopharyngolaryngoscopy  Diagnosis: L neck swelling  : Dr Darryl Delgado    The risks, " benefits and alternatives were discussed  The patient voiced their understanding  They wished to proceed and an informed consent was obtained  The patient's nose was topically decongested and anesthetized using Lidocaine and oxymetazoline  The fiberoptic endoscope was inserted via the left nasal cavity  Findings listed below:  --Septal deviation towards R, normal appearing nasopharynx, fossa of Rosenmüller, oropharynx, epiglottis  BOT w/ enlargement b/l abutting the epiglottis anteriorly, no exudate, no erythema  - Vocal folds mobile bilaterally  - No Subglottic edema  - No Ventricular obliteration  - No arytenoid erythema  - Mild diffuse edema without vocal fold edema  - Mild posterior commissure edema  - No granulation  - No thick endolaryngeal mucus      RADIOLOGY  [unfilled]    There are no problems to display for this patient          Miguel Stokes MD  Otolaryngology--Head and Neck Surgery  [employer] [employer]  5/28/2023 8:41 AM

## 2023-05-28 NOTE — ED NOTES
Pt and family concerned for increased size in neck since arrival to ED  Pt denies airway compromise or difficulty breathing  Pt instructed to call if respiratory compromise, change in effort of breathing, or difficulty swallowing        Suman Lomax RN  05/27/23 5989

## 2023-05-28 NOTE — PROGRESS NOTES
Transfer Progress Note   - ICU Transfer to Step down/med  surg  Edil Shadow 25 y o  male MRN: [id number]  Unit/Bed#: [facility] 204-01 Encounter: [id number]      Code Status: Level 1 - Full Code  POA:    POLST:      Date of ICU admission: 5/28/2023    Reason for ICU adm: Neck swelling [R22 1]    Active problems:   Patient Active Problem List   Diagnosis   • Neck mass     Neck mass  Assessment & Plan  · Rapidly enlarging left mass, admitted to [facility] for airway watch   · No history of B symptoms   · No axillary or groin lymphadenopathy   · No fevers, recent illness, pharyngitis     Workup:  · 5/27 CT neck w  Con: Enlarged L cervical lymph node, 15 5 cm, significant mass effect upon surrounding soft tissue structures consistent with neoplasm, likely lymphoma  · 5/27 CT chest w con: Mild upper mediastinal lymphadenopathy likely related to patient's left cervical chain neoplasm  · HIV pending     Plan  · IR core needle biopsy - Tuesday   · Would not recommend d/c home until biopsy given history of rapid progression   · Follow up HIV   · Follow up Lyme serologies - although doubt these will be positive given too early in clinical course for Ab to form  · Can continue doxy for empirical tx of lyme   · Follow up Bartonella   · Follow up CT abd pelvis ordered   · ENT consulted - appreciate reccs       Consultants: ENT, IR     Summary of clinical course:   Sabas Suarez is a 25 y o  male with no significant medical history presents with the complaint of left sided neck swelling  The pt states that he was camping with his mother and wife yesterday and his family noticed he had neck swelling  This was not there prior to yesterday and this the first time he has experienced this  He denies any symptoms such as SOB, voice change, or difficulty swallowing  He has no allergies  He denies bug bites   CT soft tissue neck shows Enlarged left cervical lymph node conglomerate as detailed above measuring up to 15 5 cm in craniocaudal dimension and with significant mass effect upon the surrounding soft tissue structures consistent with neoplasm, likely lymphoma  He was sent to [facility] for airway watch  hes bring treated empirically with doxy bc of hx of tick exposure (dogs had ticks on them, but patient did not have tick bite) Patient denies B symptoms  no fever chills fatigue  no white count  ENT saw him  needs IR biopsy  will be done in patient tuesday  dont reccomend sending him home bc the mass was so rapidly enlarging when he presented   right now protecting airway and eating normal diet      Recent or scheduled procedures: IR core needle biopsy Tuesday 5/30    Recent diagnostics: n/a     Nutrition Plan: po regular diet     Mobilization Plan/Activity: OOB/PT

## 2023-05-28 NOTE — EMTALA/ACUTE CARE TRANSFER
[address] [place] [address] 20  [address] [place] [address]  Dept: [phone]      [facility]    NAME Terri Waldron                                         1995                              MRN [id number]    I have been informed of my rights regarding examination, treatment, and transfer   by Dr Tremayne Matos MD    Benefits: Specialized equipment and/or services available at the receiving facility (Include comment)________________________    Risks: Potential for delay in receiving treatment, Increased discomfort during transfer, Possible worsening of condition or death during transfer, Potential deterioration of medical condition, Loss of IV      Transfer Request   I acknowledge that my medical condition has been evaluated and explained to me by the emergency department physician or other qualified medical person and/or my attending physician who has recommended and offered to me further medical examination and treatment  I understand the Hospital's obligation with respect to the treatment and stabilization of my emergency medical condition  I nevertheless request to be transferred  I release the Hospital, the doctor, and any other persons caring for me from all responsibility or liability for any injury or ill effects that may result from my transfer and agree to accept all responsibility for the consequences of my choice to transfer, rather than receive stabilizing treatment at the Hospital  I understand that because the transfer is my request, my insurance may not provide reimbursement for the services  The Hospital will assist and direct me and my family in how to make arrangements for transfer, but the hospital is not liable for any fees charged by the transport service    In spite of this understanding, I refuse to consent to further medical examination and treatment which has been offered to me, and request transfer to  [place] Name, City & State : [facility]  I authorize the performance of emergency medical procedures and treatments upon me in both transit and upon arrival at the receiving facility  Additionally, I authorize the release of any and all medical records to the receiving facility and request they be transported with me, if possible  I authorize the performance of emergency medical procedures and treatments upon me in both transit and upon arrival at the receiving facility  Additionally, I authorize the release of any and all medical records to the receiving facility and request they be transported with me, if possible  I understand that the safest mode of transportation during a medical emergency is an ambulance and that the Hospital advocates the use of this mode of transport  Risks of traveling to the receiving facility by car, including absence of medical control, life sustaining equipment, such as oxygen, and medical personnel has been explained to me and I fully understand them  (ELIJAH CORRECT BOX BELOW)  [  ]  I consent to the stated transfer and to be transported by ambulance/helicopter  [  ]  I consent to the stated transfer, but refuse transportation by ambulance and accept full responsibility for my transportation by car  I understand the risks of non-ambulance transfers and I exonerate the Hospital and its staff from any deterioration in my condition that results from this refusal     X___________________________________________    DATE  23  TIME________  Signature of patient or legally responsible individual signing on patient behalf           RELATIONSHIP TO PATIENT_________________________          Provider Certification    NAME Nidhi Fonseca                                         1995                              MRN [id number]    A medical screening exam was performed on the above named patient    Based on the examination:    Condition Necessitating Transfer The encounter diagnosis was Lymphadenopathy of head and neck  Patient Condition: The patient has been stabilized such that within reasonable medical probability, no material deterioration of the patient condition or the condition of the unborn child(robyn) is likely to result from the transfer    Reason for Transfer: Level of Care needed not available at this facility    Transfer Requirements: Facility [facility]   · Space available and qualified personnel available for treatment as acknowledged by    · Agreed to accept transfer and to provide appropriate medical treatment as acknowledged by       [facility]  · Appropriate medical records of the examination and treatment of the patient are provided at the time of transfer   [facility], [address] _______  · Transfer will be performed by qualified personnel from    and appropriate transfer equipment as required, including the use of necessary and appropriate life support measures      Provider Certification: I have examined the patient and explained the following risks and benefits of being transferred/refusing transfer to the patient/family:  General risk, such as traffic hazards, adverse weather conditions, rough terrain or turbulence, possible failure of equipment (including vehicle or aircraft), or consequences of actions of persons outside the control of the transport personnel, Risk of worsening condition, The possibility of a transport vehicle being unavailable, Unanticipated needs of medical equipment and personnel during transport, Consent was not obtained as patient is committed to psychiatric facility and transfer is mandated, The patient is stable for psychiatric transfer because they are medically stable, and is protected from harming him/herself or others during transport      Based on these reasonable risks and benefits to the patient and/or the unborn child(robyn), and based upon the information available at the time of the patient’s examination, I certify that the medical benefits reasonably to be expected from the provision of appropriate medical treatments at another medical facility outweigh the increasing risks, if any, to the individual’s medical condition, and in the case of labor to the unborn child, from effecting the transfer      X____________________________________________ DATE 05/27/23        TIME_______      ORIGINAL - SEND TO MEDICAL RECORDS   COPY - SEND WITH PATIENT DURING TRANSFER

## 2023-05-29 LAB
ANION GAP SERPL CALCULATED.3IONS-SCNC: 2 MMOL/L (ref 4–13)
BUN SERPL-MCNC: 13 MG/DL (ref 5–25)
CALCIUM SERPL-MCNC: 9.5 MG/DL (ref 8.3–10.1)
CHLORIDE SERPL-SCNC: 106 MMOL/L (ref 96–108)
CO2 SERPL-SCNC: 25 MMOL/L (ref 21–32)
CREAT SERPL-MCNC: 0.98 MG/DL (ref 0.6–1.3)
ERYTHROCYTE [DISTWIDTH] IN BLOOD BY AUTOMATED COUNT: 13.5 % (ref 11.6–15.1)
GFR SERPL CREATININE-BSD FRML MDRD: 103 ML/MIN/1.73SQ M
GLUCOSE SERPL-MCNC: 106 MG/DL (ref 65–140)
HCT VFR BLD AUTO: 36.3 % (ref 36.5–49.3)
HGB BLD-MCNC: 12 G/DL (ref 12–17)
MAGNESIUM SERPL-MCNC: 2.3 MG/DL (ref 1.6–2.6)
MCH RBC QN AUTO: 27.8 PG (ref 26.8–34.3)
MCHC RBC AUTO-ENTMCNC: 33.1 G/DL (ref 31.4–37.4)
MCV RBC AUTO: 84 FL (ref 82–98)
PLATELET # BLD AUTO: 426 THOUSANDS/UL (ref 149–390)
PMV BLD AUTO: 8.9 FL (ref 8.9–12.7)
POTASSIUM SERPL-SCNC: 4.1 MMOL/L (ref 3.5–5.3)
RBC # BLD AUTO: 4.32 MILLION/UL (ref 3.88–5.62)
SODIUM SERPL-SCNC: 133 MMOL/L (ref 135–147)
WBC # BLD AUTO: 6.91 THOUSAND/UL (ref 4.31–10.16)

## 2023-05-29 PROCEDURE — 85027 COMPLETE CBC AUTOMATED: CPT | Performed by: STUDENT IN AN ORGANIZED HEALTH CARE EDUCATION/TRAINING PROGRAM

## 2023-05-29 PROCEDURE — 83735 ASSAY OF MAGNESIUM: CPT | Performed by: STUDENT IN AN ORGANIZED HEALTH CARE EDUCATION/TRAINING PROGRAM

## 2023-05-29 PROCEDURE — 80048 BASIC METABOLIC PNL TOTAL CA: CPT | Performed by: STUDENT IN AN ORGANIZED HEALTH CARE EDUCATION/TRAINING PROGRAM

## 2023-05-29 PROCEDURE — 99232 SBSQ HOSP IP/OBS MODERATE 35: CPT | Performed by: INTERNAL MEDICINE

## 2023-05-29 RX ADMIN — DOXYCYCLINE 100 MG: 100 CAPSULE ORAL at 20:57

## 2023-05-29 RX ADMIN — DOXYCYCLINE 100 MG: 100 CAPSULE ORAL at 07:47

## 2023-05-29 NOTE — PLAN OF CARE
Problem: PAIN - ADULT  Goal: Verbalizes/displays adequate comfort level or baseline comfort level  Description: Interventions:  - Encourage patient to monitor pain and request assistance  - Assess pain using appropriate pain scale  - Administer analgesics based on type and severity of pain and evaluate response  - Implement non-pharmacological measures as appropriate and evaluate response  - Consider cultural and social influences on pain and pain management  - Notify physician/advanced practitioner if interventions unsuccessful or patient reports new pain  Outcome: Progressing     Problem: SAFETY ADULT  Goal: Patient will remain free of falls  Description: INTERVENTIONS:  - Educate patient/family on patient safety including physical limitations  - Instruct patient to call for assistance with activity  - Apply yellow socks and bracelet for high fall risk patients  Outcome: Progressing

## 2023-05-29 NOTE — CASE MANAGEMENT
Case Management Assessment & Discharge Planning Note    Patient name José Miguel Ambrocio  Location [facility] 931/[facility] 931-01 MRN [id number]  : 1995 Date 2023       Current Admission Date: 2023  Current Admission Diagnosis:Neck mass   Patient Active Problem List    Diagnosis Date Noted   • Neck mass 2023      LOS (days): 1  Geometric Mean LOS (GMLOS) (days):   Days to GMLOS:     OBJECTIVE:    Risk of Unplanned Readmission Score: 6 93         Current admission status: Inpatient       Preferred Pharmacy:   [facility] [address] LUIGI KITCHEN [address] [facility], [address]  [facility] [place] [address]  Phone: [phone] Fax: [phone]    Primary Care Provider: No primary care provider on file  Primary Insurance:   Secondary Insurance:     ASSESSMENT:  Active Health Care Proxies    There are no active Health Care Proxies on file  Advance Directives  Does patient have a [address]?: No  Does patient have Advance Directives?: No              Patient Information  Admitted from[de-identified] Home  Mental Status: Alert  During Assessment patient was accompanied by: Spouse  Assessment information provided by[de-identified] Patient  Primary Caregiver: Self  Support Systems: Parent, Friend, Spouse/significant other, Family members  Home entry access options   Select all that apply : Stairs  Number of steps to enter home : 7  Do the steps have railings?: Yes  Type of Current Residence: [address] [place] [address]  Upon entering residence, is there a bedroom on the main floor (no further steps)?: No  A bedroom is located on the following floor levels of residence (select all that apply):: 3rd Floor  Upon entering residence, is there a bathroom on the main floor (no further steps)?: No  Indicate which floors of current residence have a bathroom (select all the apply):: 2nd Floor  Number of steps to 2nd floor from main floor: One Flight  Number of steps to 3rd floor from main floor: Two Flights  In the last 12 months, was there a time when you were not able to pay the mortgage or rent on time?: No  In the last 12 months, how many places have you lived?: 1  In the last 12 months, was there a time when you did not have a steady place to sleep or slept in a shelter (including now)?: No  Living Arrangements: Lives w/ Spouse/significant other    Activities of Daily Living Prior to Admission  Functional Status: Independent  Completes ADLs independently?: Yes  Ambulates independently?: Yes  Does patient use assisted devices?: No  Does patient currently own DME?: No  Does patient have a history of Outpatient Therapy (PT/OT)?: No  Does the patient have a history of Short-Term Rehab?: No  Does patient have a history of HHC?: No         Patient Information Continued  Income Source: Self-employed  Does patient have prescription coverage?: No  Within the past 12 months, you worried that your food would run out before you got the money to buy more : Never true  Within the past 12 months, the food you bought just didn't last and you didn't have money to get more : Never true  Does patient have a history of substance abuse?: No  Does patient have a history of Mental Health Diagnosis?: No         Means of Transportation  Means of Transport to Appts[de-identified] Drives Self  In the past 12 months, has lack of transportation kept you from medical appointments or from getting medications?: No  In the past 12 months, has lack of transportation kept you from meetings, work, or from getting things needed for daily living?: No        DISCHARGE DETAILS:    Discharge planning discussed with[de-identified] pt and wife  Freedom of Choice: Yes                   Contacts  Patient Contacts: wife Jacqueline Donaldson  Relationship to Patient[de-identified] Family  Contact Method: Phone  Phone Number: [phone]  Reason/Outcome: Continuity of Care, Emergency Contact, Discharge Planning              Other Referral/Resources/Interventions Provided:  Financial Resources Provided: Financial Counselor Additional Comments: PATHs for follow up-no insurance   Email sent to [employer]    CM reviewed d/c planning process including the following: identifying help at home, patient preference for d/c planning needs, Discharge Lounge, [facility] Meds to Bed program, availability of treatment team to discuss questions or concerns patient and/or family may have regarding understanding medications and recognizing signs and symptoms once discharged  CM also encouraged patient to follow up with all recommended appointments after discharge  Patient advised of importance for patient and family to participate in managing patient’s medical well being  Patient/caregiver received discharge checklist   Content reviewed  Patient/caregiver encouraged to participate in discharge plan of care prior to discharge home

## 2023-05-29 NOTE — UTILIZATION REVIEW
Initial Clinical Review    TRANSFER FROM [facility]  ED    Admission: Date/Time/Statement:   Admission Orders (From admission, onward)     Ordered        05/28/23 0237  Inpatient Admission  Once                      Orders Placed This Encounter   Procedures   • Inpatient Admission     Standing Status:   Standing     Number of Occurrences:   1     Order Specific Question:   Level of Care     Answer:   Level 1 Stepdown [13]     Order Specific Question:   Estimated length of stay     Answer:   More than 2 Midnights     Order Specific Question:   Certification     Answer:   I certify that inpatient services are medically necessary for this patient for a duration of greater than two midnights  See H&P and MD Progress Notes for additional information about the patient's course of treatment  Initial Presentation: 25 y o  male initially presented to ED at  [facility] from home with left sided neck swelling  Noticed swelling the day prior to admission while  Camping,  Not there previously  Denies any other symptoms  Or bug bites  Ct scan   Shows significant mass, likely lymphoma  Transferred to  [facility] for further care  Admit  IP with  Large left neck swelling,  Mild hyponatremia  hypokalemia and p;faith is   ENT  Consult, monitor labs, replace electrolytes, po  Antibiotics and  Malignancy work up  ENT consult  Imaging/exam  Concerning for lymphoma  Needs   IR  Guide  Bx         Plan  IR  BX   5/30       Wt Readings from Last 1 Encounters:   05/29/23 80 9 kg (178 lb 5 6 oz)     Additional Vital Signs:   98 4 °F (36 9 °C) 94 19 127/83 98 97 % -- --    05/29/23 0300 -- 60 -- -- -- 96 % -- --   05/28/23 22:10:19 98 8 °F (37 1 °C) 81 17 139/77 98 96 % None (Room air) Sitting   05/28/23 20:17:12 98 6 °F (37 °C) 84 -- 144/75 98 97 % None (Room air) --   05/28/23 1540 99 2 °F (37 3 °C) 100 -- 137/77 97 -- -- --   05/28/23 1130 98 2 °F (36 8 °C) 82 -- 145/77 99 97 % -- --   05/28/23 0710 97 9 °F (36 6 °C) 98 16 140/75 93 98 % None (Room air) Sitting   05/28/23 0538 97 9 °F (36 6 °C) 80 18 140/83 -- -- -- --   05/28/23 0235 97 9 °F (36 6 °C) -- -- -- -- -- -- --   05/28/23 0230 -- 80 -- 140/83 99 96 % --        Pertinent Labs/Diagnostic Test Results:   CT abdomen pelvis with contrast    (Results Pending)   IR biopsy neck    (Results Pending)         Results from last 7 days   Lab Units 05/29/23 0525 05/28/23 0335 05/27/23 2120   HEMATOCRIT % 36 3* 33 9* 36 8   HEMOGLOBIN g/dL 12 0 11 1* 12 4   NEUTROS ABS Thousands/µL  --  4 29 4 99   PLATELETS Thousands/uL 426* 418* 449*   WBC Thousand/uL 6 91 6 33 7 85         Results from last 7 days   Lab Units 05/29/23 0525 05/28/23 0335 05/27/23 2120   ANION GAP mmol/L 2* 2* 9   BUN mg/dL 13 14 19   CALCIUM mg/dL 9 5 9 2 9 9   CHLORIDE mmol/L 106 104 99   CO2 mmol/L 25 28 28   CREATININE mg/dL 0 98 0 86 1 07   EGFR ml/min/1 73sq m 103 116 92   POTASSIUM mmol/L 4 1 3 2* 3 5   MAGNESIUM mg/dL 2 3 2 0  --    PHOSPHORUS mg/dL  --  3 2  --    SODIUM mmol/L 133* 134* 136             Results from last 7 days   Lab Units 05/29/23 0525 05/28/23 0335 05/27/23 2120   GLUCOSE RANDOM mg/dL 106 122 114               Results from last 7 days   Lab Units 05/27/23 2120   CK TOTAL U/L 274             Results from last 7 days   Lab Units 05/27/23 2120   INR  1 10   PROTIME seconds 14 0   PTT seconds 37     Results from last 7 days   Lab Units 05/28/23 0335   TSH 3RD GENERATON uIU/mL 5 090*               Results from last 7 days   Lab Units 05/28/23 0353   AMPH/METH  Negative   BARBITURATE UR  Negative   BENZODIAZEPINE UR  Negative   COCAINE UR  Negative   METHADONE URINE  Negative   OPIATE UR  Negative   PCP UR  Negative   THC UR  Positive*     Results from last 7 days   Lab Units 05/28/23 0335   ETHANOL LVL mg/dL <3                 Results from last 7 days   Lab Units 05/28/23  0336   BLOOD CULTURE  No Growth at 24 hrs  No Growth at 24 hrs  History reviewed   No pertinent past medical history  Present on Admission:  • Neck mass      Admitting Diagnosis: Neck swelling [R22 1]  Age/Sex: 25 y o  male  Admission Orders:  Scheduled Medications:  doxycycline hyclate, 100 mg, Oral, Q12H [facility]      Continuous IV Infusions:     PRN Meds:       IP CONSULT TO CASE MANAGEMENT  IP CONSULT TO ENT  INPATIENT CONSULT TO IR    Network Utilization Review Department  ATTENTION: Please call with any questions or concerns to [phone] and carefully listen to the prompts so that you are directed to the right person  All voicemails are confidential   Destinee Montejo all requests for admission clinical reviews, approved or denied determinations and any other requests to dedicated fax number below belonging to the campus where the patient is receiving treatment   List of dedicated fax numbers for the Facilities:  [address] DENIALS (Administrative/Medical Necessity) [phone]   [address] (Maternity/NICU/Pediatrics) [phone]   910 Stephanie Contreras [phone]   [facility] 77 [phone]   1306 [facility] 150 Medical [place] [place] [address] [place] [address] 28 [phone]   [address] [place] [address] [facility] 134 [address] [phone]

## 2023-05-29 NOTE — PLAN OF CARE
Problem: PAIN - ADULT  Goal: Verbalizes/displays adequate comfort level or baseline comfort level  Description: Interventions:  - Encourage patient to monitor pain and request assistance  - Assess pain using appropriate pain scale  - Administer analgesics based on type and severity of pain and evaluate response  - Implement non-pharmacological measures as appropriate and evaluate response  - Consider cultural and social influences on pain and pain management  - Notify physician/advanced practitioner if interventions unsuccessful or patient reports new pain  Outcome: Progressing     Problem: INFECTION - ADULT  Goal: Absence of fever/infection during neutropenic period  Description: INTERVENTIONS:  - Monitor WBC    Outcome: Progressing     Problem: SAFETY ADULT  Goal: Patient will remain free of falls  Description: INTERVENTIONS:  - Educate patient/family on patient safety including physical limitations  - Instruct patient to call for assistance with activity   - Consult OT/PT to assist with strengthening/mobility   - Keep Call bell within reach  - Keep bed low and locked with side rails adjusted as appropriate  - Keep care items and personal belongings within reach  - Initiate and maintain comfort rounds  - Make Fall Risk Sign visible to staff  - Offer Toileting every 2 Hours, in advance of need  - Initiate/Maintain alarm  - Obtain necessary fall risk management equipment  - Apply yellow socks and bracelet for high fall risk patients  - Consider moving patient to room near nurses station  Outcome: Progressing

## 2023-05-29 NOTE — PROGRESS NOTES
INTERNAL MEDICINE RESIDENCY PROGRESS NOTE     Name: Gerardo Palomares   Age & Sex: 25 y o  male   MRN: [id number]  Unit/Bed#: [facility] 931-01   Encounter: [id number]  Team: SOD Team C     PATIENT INFORMATION     Name: Gerardo Palomares   Age & Sex: 25 y o  male   MRN: [id number]  Hospital Stay Days: 1    ASSESSMENT/PLAN     Principal Problem:    Neck mass      * Neck mass  Assessment & Plan  • Rapidly enlarging left mass, originally admitted to [facility] for airway watch   • No history of B symptoms   • No axillary or groin lymphadenopathy   • No fevers, recent illness, pharyngitis   • Transferred to Med Surg         Workup:  •  CT neck w  Con: Enlarged L cervical lymph node, 15 5 cm, significant mass effect upon surrounding soft tissue structures consistent with neoplasm, likely lymphoma  •  CT chest w con: Mild upper mediastinal lymphadenopathy likely related to patient's left cervical chain neoplasm  • HIV pending      Plan  • IR core needle biopsy - Tuesday; does not need to be NPO prior  • Would not recommend d/c home until biopsy given history of rapid progression   • HIV negative  • Lyme serologies negative - although doubt these will be positive given too early in clinical course for Ab to form  Typical course is 14-21 days  Would continue for 14 days given low probability of Lyme  • Follow up Bartonella   • Follow up CT abd pelvis ordered       Disposition: Pending IR Biopsy     SUBJECTIVE     Patient seen and examined  No acute events overnight  Patient reports no issues breathing, speaking, or swallowing  He does not appear to be in any acute distress       OBJECTIVE     Vitals:    23 0300 23 0600 23 0657 23 0749   BP:   127/83    BP Location:       Pulse: 60  94    Resp:   19    Temp:   98 4 °F (36 9 °C)    TempSrc:       SpO2: 96%  97% 98%   Weight:  80 9 kg (178 lb 5 6 oz)     Height:          Temperature:   Temp (24hrs), Av 6 °F (37 °C), Min:98 2 °F (36 8 °C), Max:99 2 °F (37 3 °C)    Temperature: 98 4 °F (36 9 °C)  Intake & Output:  I/O       05/27 0701 05/28 0700 05/28 0701 05/29 0700 05/29 0701 05/30 0700    I V  (mL/kg) 30 (0 4) 35 (0 4)     IV Piggyback 100 100     Total Intake(mL/kg) 130 (1 6) 135 (1 7)     Urine (mL/kg/hr)  400 (0 2)     Stool  0     Total Output  400     Net +130 -265            Unmeasured Stool Occurrence  0 x         Weights:   IBW (Ideal Body Weight): 75 3 kg    Body mass index is 24 88 kg/m²  Weight (last 2 days)     Date/Time Weight    05/29/23 0600 80 9 (178 35)    05/28/23 0533 82 (180 78)    05/28/23 0532 82 (180 78)    05/28/23 0238 82 4 (181 7)        Physical Exam  Constitutional:       Appearance: He is well-developed  HENT:      Head: Normocephalic and atraumatic  Nose: Nose normal    Eyes:      Extraocular Movements: Extraocular movements intact  Conjunctiva/sclera: Conjunctivae normal    Neck:      Comments: Large left neck mass  Cardiovascular:      Rate and Rhythm: Normal rate  Pulmonary:      Effort: Pulmonary effort is normal  No respiratory distress  Abdominal:      Palpations: Abdomen is soft  Tenderness: There is no abdominal tenderness  Skin:     General: Skin is warm and dry  Neurological:      Mental Status: He is alert and oriented to person, place, and time  Psychiatric:         Mood and Affect: Mood normal          Behavior: Behavior normal        LABORATORY DATA     Labs: I have personally reviewed pertinent reports    Results from last 7 days   Lab Units 05/29/23 0525 05/28/23 0335 05/27/23 2120   EOS PCT %  --  1 1   HEMATOCRIT % 36 3* 33 9* 36 8   HEMOGLOBIN g/dL 12 0 11 1* 12 4   MONOS PCT %  --  13* 11   NEUTROS PCT %  --  69 63   PLATELETS Thousands/uL 426* 418* 449*   WBC Thousand/uL 6 91 6 33 7 85      Results from last 7 days   Lab Units 05/29/23 0525 05/28/23 0335 05/27/23 2120   BUN mg/dL 13 14 19   CALCIUM mg/dL 9 5 9 2 9 9   CHLORIDE mmol/L 106 104 99   CO2 mmol/L 25 28 "28   CREATININE mg/dL 0 98 0 86 1 07   POTASSIUM mmol/L 4 1 3 2* 3 5     Results from last 7 days   Lab Units 05/29/23  0525 05/28/23  0335   MAGNESIUM mg/dL 2 3 2 0     Results from last 7 days   Lab Units 05/28/23  0335   PHOSPHORUS mg/dL 3 2      Results from last 7 days   Lab Units 05/27/23  2120   INR  1 10   PTT seconds 37               IMAGING & DIAGNOSTIC TESTING     Radiology Results: I have personally reviewed pertinent reports  No results found  Other Diagnostic Testing: I have personally reviewed pertinent reports  ACTIVE MEDICATIONS     Current Facility-Administered Medications   Medication Dose Route Frequency   • doxycycline hyclate (VIBRAMYCIN) capsule 100 mg  100 mg Oral Q12H [address]       VTE Pharmacologic Prophylaxis: Reason for no pharmacologic prophylaxis ambulatory  VTE Mechanical Prophylaxis: sequential compression device    Portions of the record may have been created with voice recognition software  Occasional wrong word or \"sound a like\" substitutions may have occurred due to the inherent limitations of voice recognition software    Read the chart carefully and recognize, using context, where substitutions have occurred   ==  Franco Davenport, [address] Azra Saunders MD  [address]  Internal Medicine Residency PGY-1       "

## 2023-05-29 NOTE — CASE MANAGEMENT
Case Management Discharge Planning Note    Patient name Danni Crooks  Location [facility] 931/[facility] 931-01 MRN [id number]  : 1995 Date 2023       Current Admission Date: 2023  Current Admission Diagnosis:Neck mass   Patient Active Problem List    Diagnosis Date Noted   • Neck mass 2023      LOS (days): 1  Geometric Mean LOS (GMLOS) (days):   Days to GMLOS:     OBJECTIVE:  Risk of Unplanned Readmission Score: 6 93         Current admission status: Inpatient   Preferred Pharmacy:   [facility] [address] LUIGI KITCHEN [address] [facility]  [facility] [address]  Phone: [phone] Fax: [phone]    Primary Care Provider: No primary care provider on file  Primary Insurance:   Secondary Insurance:     DISCHARGE DETAILS:    CM consult-pt does not have insurance    PATHS will follow up with pt for MA melissa

## 2023-05-29 NOTE — DISCHARGE SUMMARY
"      INTERNAL MEDICINE RESIDENCY DISCHARGE SUMMARY     Renu Fritz   25 y o  male  MRN: [id number]  Room/Bed: [facility] 931/[facility] 931-01     [address] [address]   Encounter: [id number]    Principal Problem:    Neck mass      * Neck mass  Assessment & Plan  Rapidly enlarging left mass, originally admitted to [facility] for airway watch   No history of B symptoms   No axillary or groin lymphadenopathy   No fevers, recent illness, pharyngitis   Transferred to Med Surg 5/28     Workup:  5/27 CT neck w  Con: Enlarged L cervical lymph node, 15 5 cm, significant mass effect upon surrounding soft tissue structures consistent with neoplasm, likely lymphoma  5/27 CT chest w con: Mild upper mediastinal lymphadenopathy likely related to patient's left cervical chain neoplasm  CT Abd/pelvis: 2 enlarged lymph nodes along aortocaval space, left external iliac chain; 1 mm right pulmonary nodule  HIV negative, Lyme negative  Leukemia and lymphoma flow cytometry sent     Plan  S/p IR core needle biopsy on Tuesday 5/30; patient elects to seek outpatient follow up in [address] [facility] at the [place] [address] there  Patient advised to follow up with hematology/oncology there and establish care with primary care doctor there  Patient advised to return to [facility] if desired for treatment  Lyme serologies negative - although doubt these will be positive given too early in clinical course for Ab to form  Typical doxycycline course is 14-21 days  Would doxycyline continue for 14 day course given low probability of Lyme, Rx sent on discharge for 14 day course ending 6/13/23  Follow up Bartonella, HSV, IgE, Tryptase      DETAILS OF HOSPITAL COURSE     Per initial H&P:  \"Codie Smith is a 25 y o  male with no significant medical history presents with the complaint of left sided neck swelling   The pt states that he was camping with his mother and wife yesterday and his family noticed he had neck " "swelling  This was not there prior to yesterday and this the first time he has experienced this  He denies any symptoms such as SOB, voice change, or difficulty swallowing  He has no allergies  He denies bug bites  CT soft tissue neck shows Enlarged left cervical lymph node conglomerate as detailed above measuring up to 15 5 cm in craniocaudal dimension and with significant mass effect upon the surrounding soft tissue structures consistent with neoplasm, likely lymphoma  Pt is transferred to [facility] for airway watch and ENT consult  \"    Remainder of hospital course:  No acute events during the hospitalization, patient maintained patent airway and with no reports of dysphagia  Patient is status post IR neck mass biopsy on 5/30  Leukemia and lymphoma flow cytometry sent  Patient wishes to follow-up with [facility] for review of results  Patient advised to follow-up with PCP within 1 to 2 weeks of discharge  Patient advised to follow-up with medical oncology at [facility] (patient preference) or at [place] [address]  Patient discharged on PO doxycycline to finish 14 day course  Patient advised to return to the emergency department if he experiences any shortness of breath or dysphagia, patient acknowledged and agreed to do this       DISCHARGE INFORMATION     Vitals at Discharge:  Visit Vitals  /85   Pulse 71   Temp 98 1 °F (36 7 °C)   Resp 18   Ht 5' 11\" (1 803 m)   Wt 80 2 kg (176 lb 12 9 oz)   SpO2 97%   BMI 24 66 kg/m²   Smoking Status Never   BSA 2 m²       Physical Exam at Discharge:  General: No apparent distress, resting comfortably   Head: Normocephalic, atraumatic  Eyes: Anicteric, no conjunctival erythema  ENT: External ear normal, no nasal discharge, large neck mass  Respiratory: Non-labored respirations, symmetric thorax expansion  Cardiovascular: Extremities appear well-perfused  Abdomen: Non-distended  Extremities: Moves extremities spontaneously, no peripheral edema  Neuro: A&O x 3, " "no gross focal deficits, no aphasia    PCP at Discharge: No PCP on file    Admitting Provider: Melonie Stokes MD  Admission Date: 5/28/2023    Discharge Provider: Glory Acosta MD  Discharge Date: 5/30/2023    Discharge Disposition: Home  Discharge Condition: good  Discharge with Lines: no    Discharge Diet: regular diet  Activity Restrictions: none  Test Results Pending at Discharge: HSV, Bartonella, IgE, tryptase    Discharge Diagnoses:  Principal Problem:    Neck mass  Resolved Problems:    * No resolved hospital problems  *      Consulting Providers:  ENT, ICU    Diagnostic & Therapeutic Procedures Performed:  No results found  Code Status: Level 1 - Full Code  Advance Directive & Living Will: <no information>  Power of :    POLST:      Medications:  Doxycycline for full 14-day course ending on 6/13/2023    Allergies: Allergies   Allergen Reactions   • Tylenol [Acetaminophen] Swelling       FOLLOW-UP     PCP Outpatient Follow-up:  Follow-up with PCP within 1-2 weeks after discharge  Consulting Providers Follow-up:  Follow-up with hematology oncology for review of biopsy results    Active Issues Requiring Follow-up:   IR tissue biopsy    Discharge Statement:   I spent 30 minutes minutes discharging the patient  This time was spent on the day of discharge  I had direct contact with the patient on the day of discharge  Additional documentation is required if more than 30 minutes were spent on discharge  Portions of the record may have been created with voice recognition software  Occasional wrong word or \"sound a like\" substitutions may have occurred due to the inherent limitations of voice recognition software    Read the chart carefully and recognize, using context, where substitutions have occurred     ==  Tana Moctezuma, [address]  Internal Medicine Resident PGY-1      "

## 2023-05-30 ENCOUNTER — APPOINTMENT (INPATIENT)
Dept: RADIOLOGY | Facility: HOSPITAL | Age: 28
DRG: 681 | End: 2023-05-30
Payer: COMMERCIAL

## 2023-05-30 VITALS
OXYGEN SATURATION: 97 % | HEIGHT: 71 IN | HEART RATE: 71 BPM | BODY MASS INDEX: 24.75 KG/M2 | SYSTOLIC BLOOD PRESSURE: 153 MMHG | TEMPERATURE: 98.1 F | WEIGHT: 176.81 LBS | RESPIRATION RATE: 18 BRPM | DIASTOLIC BLOOD PRESSURE: 85 MMHG

## 2023-05-30 PROCEDURE — 88360 TUMOR IMMUNOHISTOCHEM/MANUAL: CPT | Performed by: STUDENT IN AN ORGANIZED HEALTH CARE EDUCATION/TRAINING PROGRAM

## 2023-05-30 PROCEDURE — 88374 M/PHMTRC ALYS ISHQUANT/SEMIQ: CPT | Performed by: STUDENT IN AN ORGANIZED HEALTH CARE EDUCATION/TRAINING PROGRAM

## 2023-05-30 PROCEDURE — 81455 SO/HL 51/>GSAP DNA/DNA&RNA: CPT | Performed by: STUDENT IN AN ORGANIZED HEALTH CARE EDUCATION/TRAINING PROGRAM

## 2023-05-30 PROCEDURE — 88184 FLOWCYTOMETRY/ TC 1 MARKER: CPT | Performed by: INTERNAL MEDICINE

## 2023-05-30 PROCEDURE — 99024 POSTOP FOLLOW-UP VISIT: CPT | Performed by: STUDENT IN AN ORGANIZED HEALTH CARE EDUCATION/TRAINING PROGRAM

## 2023-05-30 PROCEDURE — 88312 SPECIAL STAINS GROUP 1: CPT | Performed by: STUDENT IN AN ORGANIZED HEALTH CARE EDUCATION/TRAINING PROGRAM

## 2023-05-30 PROCEDURE — 76942 ECHO GUIDE FOR BIOPSY: CPT | Performed by: STUDENT IN AN ORGANIZED HEALTH CARE EDUCATION/TRAINING PROGRAM

## 2023-05-30 PROCEDURE — 07B23ZX EXCISION OF LEFT NECK LYMPHATIC, PERCUTANEOUS APPROACH, DIAGNOSTIC: ICD-10-PCS | Performed by: STUDENT IN AN ORGANIZED HEALTH CARE EDUCATION/TRAINING PROGRAM

## 2023-05-30 PROCEDURE — 88185 FLOWCYTOMETRY/TC ADD-ON: CPT

## 2023-05-30 PROCEDURE — 88365 INSITU HYBRIDIZATION (FISH): CPT | Performed by: STUDENT IN AN ORGANIZED HEALTH CARE EDUCATION/TRAINING PROGRAM

## 2023-05-30 PROCEDURE — 20206 BIOPSY MUSCLE PERQ NEEDLE: CPT

## 2023-05-30 PROCEDURE — 38505 NEEDLE BIOPSY LYMPH NODES: CPT | Performed by: STUDENT IN AN ORGANIZED HEALTH CARE EDUCATION/TRAINING PROGRAM

## 2023-05-30 PROCEDURE — 88342 IMHCHEM/IMCYTCHM 1ST ANTB: CPT | Performed by: STUDENT IN AN ORGANIZED HEALTH CARE EDUCATION/TRAINING PROGRAM

## 2023-05-30 PROCEDURE — 99238 HOSP IP/OBS DSCHRG MGMT 30/<: CPT | Performed by: INTERNAL MEDICINE

## 2023-05-30 PROCEDURE — 76942 ECHO GUIDE FOR BIOPSY: CPT

## 2023-05-30 PROCEDURE — 88341 IMHCHEM/IMCYTCHM EA ADD ANTB: CPT | Performed by: STUDENT IN AN ORGANIZED HEALTH CARE EDUCATION/TRAINING PROGRAM

## 2023-05-30 PROCEDURE — 88307 TISSUE EXAM BY PATHOLOGIST: CPT | Performed by: STUDENT IN AN ORGANIZED HEALTH CARE EDUCATION/TRAINING PROGRAM

## 2023-05-30 RX ORDER — LIDOCAINE HYDROCHLORIDE 10 MG/ML
INJECTION, SOLUTION EPIDURAL; INFILTRATION; INTRACAUDAL; PERINEURAL AS NEEDED
Status: DISCONTINUED | OUTPATIENT
Start: 2023-05-30 | End: 2023-05-30 | Stop reason: HOSPADM

## 2023-05-30 RX ORDER — DOXYCYCLINE HYCLATE 100 MG/1
100 CAPSULE ORAL EVERY 12 HOURS SCHEDULED
Qty: 28 CAPSULE | Refills: 0 | Status: SHIPPED | OUTPATIENT
Start: 2023-05-30 | End: 2023-06-13

## 2023-05-30 RX ADMIN — LIDOCAINE HYDROCHLORIDE 10 ML: 10 INJECTION, SOLUTION EPIDURAL; INFILTRATION; INTRACAUDAL; PERINEURAL at 11:03

## 2023-05-30 RX ADMIN — DOXYCYCLINE 100 MG: 100 CAPSULE ORAL at 09:10

## 2023-05-30 NOTE — CASE MANAGEMENT
Case Management Discharge Planning Note    Patient name Sunny Danbury  Location [facility]-01 MRN [id number]  : 1995 Date 2023       Current Admission Date: 2023  Current Admission Diagnosis:Neck mass   Patient Active Problem List    Diagnosis Date Noted   • Neck mass 2023      LOS (days): 2  Geometric Mean LOS (GMLOS) (days):   Days to GMLOS:     OBJECTIVE:  Risk of Unplanned Readmission Score: 6 9         Current admission status: Inpatient   Preferred Pharmacy:   [address] [place] [address], [address]  [facility] [address]  Phone: [phone] Fax: [employer] [address] [place] [address]  Phone: [phone] Fax: [phone]    Primary Care Provider: No primary care provider on file  Primary Insurance: [employer] PENDING  Secondary Insurance:     DISCHARGE DETAILS:       Additional Comments: CM let providers know that PATHs had been contacted and reported the woud meet with pt  Per chart review, pt is clear for discharge - CM not made aware of any other CM needs

## 2023-05-30 NOTE — H&P
"Interventional Radiology Preprocedure Note    History/Indication for procedure:   Ivet Garcia is a 25 y o  male with a PMH of L neck enlargement who presents for US guided biopsy  /72   Pulse 103   Temp 98 1 °F (36 7 °C)   Resp 16   Ht 5' 11\" (1 803 m)   Wt 80 2 kg (176 lb 12 9 oz)   SpO2 97%   BMI 24 66 kg/m²     Relevant past medical history:    History reviewed  No pertinent past medical history  Patient Active Problem List   Diagnosis   • Neck mass       Medications:    Inpatient Medications:     Scheduled Medications:  Current Facility-Administered Medications   Medication Dose Route Frequency Provider Last Rate   • doxycycline hyclate  100 mg Oral Q12H [address] Jasmit Jerry, DO         Infusions:       PRN:      Outpatient Medications:  No current facility-administered medications on file prior to encounter  No current outpatient medications on file prior to encounter  Allergies   Allergen Reactions   • Tylenol [Acetaminophen] Swelling       Anticoagulants: none    Labs:   CBC with diff:   Lab Results   Component Value Date    HCT 36 3 (L) 05/29/2023    HGB 12 0 05/29/2023    MCH 27 8 05/29/2023    MCHC 33 1 05/29/2023    MCV 84 05/29/2023    MPV 8 9 05/29/2023    NRBC 0 05/28/2023     (H) 05/29/2023    RBC 4 32 05/29/2023    RDW 13 5 05/29/2023    WBC 6 91 05/29/2023     BMP/CMP:  Lab Results   Component Value Date    BUN 13 05/29/2023    CALCIUM 9 5 05/29/2023     05/29/2023    CO2 25 05/29/2023    CREATININE 0 98 05/29/2023    EGFR 103 05/29/2023    K 4 1 05/29/2023   ,     Coags:   Lab Results   Component Value Date    INR 1 10 05/27/2023    PTT 37 05/27/2023   ,   Results from last 7 days   Lab Units 05/27/23 2120   INR  1 10   PTT seconds 37        Relevant imaging studies:   Reviewed  Directed physical examination:  I agree with the physical exam performed on 5/29/23 and there are no additional changes      Assessment/Plan:   US guided L neck " biopsy  Sedation/Anesthesia plan:  Local sedation will be used as needed for procedure  Consent with alternatives to the procedure, risks and benefits have been explained and discussed with the patient/patient's family: yes

## 2023-05-30 NOTE — DISCHARGE INSTRUCTIONS
POST BIOPSY    Care after your procedure:    1  Limit your activities for 24 hours after your biopsy  2  No driving day of biopsy  3  Return to your normal diet  Small sips of flat soda will help with mild nausea  4  Remove band-aid or dressing 24 hours after procedure  Contact Interventional Radiology at [phone] Erasmo PATIENTS: Contact Interventional Radiology at [phone]) Colleen Ortiz PATIENTS: Contact Interventional Radiology at [phone]) if:    1  Difficulty breathing, nausea or vomiting  2  Chills or fever above 101 degrees F      3  Pain at biopsy site not relieved by medication  4  Develop any redness, swelling, heat, unusual drainage, heavy bruising or bleeding from biopsy site

## 2023-05-30 NOTE — DISCHARGE INSTR - AVS FIRST PAGE
Please  doxycycline and continue taking for total of 14 days ending 6/13/23  Make appointment with hematology/oncology at [facility] in [place] to follow up on your biopsy results  Please call them to make appointment  Establish care with primary care doctor within 1-2 weeks with [facility]  Please call them to make appointment     If difficulty breathing or trouble swallowing, please go directly to emergency room as discussed

## 2023-05-30 NOTE — PLAN OF CARE
Problem: PAIN - ADULT  Goal: Verbalizes/displays adequate comfort level or baseline comfort level  Description: Interventions:  - Encourage patient to monitor pain and request assistance  - Assess pain using appropriate pain scale  - Administer analgesics based on type and severity of pain and evaluate response  - Implement non-pharmacological measures as appropriate and evaluate response  - Consider cultural and social influences on pain and pain management  - Notify physician/advanced practitioner if interventions unsuccessful or patient reports new pain  Outcome: Progressing     Problem: INFECTION - ADULT  Goal: Absence or prevention of progression during hospitalization  Description: INTERVENTIONS:  - Assess and monitor for signs and symptoms of infection  - Monitor lab/diagnostic results  - Monitor all insertion sites, i e  indwelling lines, tubes, and drains  - Monitor endotracheal if appropriate and nasal secretions for changes in amount and color  - [place] appropriate cooling/warming therapies per order  - Administer medications as ordered  - Instruct and encourage patient and family to use good hand hygiene technique  - Identify and instruct in appropriate isolation precautions for identified infection/condition  Outcome: Progressing     Problem: SAFETY ADULT  Goal: Patient will remain free of falls  Description: INTERVENTIONS:  - Educate patient/family on patient safety including physical limitations  - Instruct patient to call for assistance with activity   - Consult OT/PT to assist with strengthening/mobility   - Keep Call bell within reach  - Keep bed low and locked with side rails adjusted as appropriate  - Keep care items and personal belongings within reach  - Initiate and maintain comfort rounds  Outcome: Progressing

## 2023-05-30 NOTE — SEDATION DOCUMENTATION
IR left neck mass biopsy by Dr Ramirez  Patient tolerated procedure well  Bandaid placed on site  Report called to P9 RN

## 2023-05-30 NOTE — PLAN OF CARE
Problem: INFECTION - ADULT  Goal: Absence or prevention of progression during hospitalization  Description: INTERVENTIONS:  - Assess and monitor for signs and symptoms of infection  - Monitor lab/diagnostic results  - Monitor all insertion sites, i e  indwelling lines, tubes, and drains  - Monitor endotracheal if appropriate and nasal secretions for changes in amount and color  - [place] appropriate cooling/warming therapies per order  - Administer medications as ordered  - Instruct and encourage patient and family to use good hand hygiene technique  - Identify and instruct in appropriate isolation precautions for identified infection/condition  Outcome: Progressing

## 2023-05-30 NOTE — BRIEF OP NOTE (RAD/CATH)
INTERVENTIONAL RADIOLOGY PROCEDURE NOTE    Date: 2023    Procedure:   Left neck LN biopsy    Preoperative diagnosis:   1  Neck mass         Postoperative diagnosis: Same  Surgeon: Treva Apgar, MD     Assistant: None  No qualified resident was available  Blood loss: 2 ml    Specimens: sent to path     Findings:   L neck cervical chain lymph node  Complications: None immediate      Anesthesia: local

## 2023-05-31 LAB — TRYPTASE SERPL-MCNC: 4.9 UG/L (ref 2.2–13.2)

## 2023-06-02 ENCOUNTER — TELEPHONE (OUTPATIENT)
Dept: HEMATOLOGY ONCOLOGY | Facility: CLINIC | Age: 28
End: 2023-06-02

## 2023-06-02 ENCOUNTER — TELEPHONE (OUTPATIENT)
Dept: OTHER | Facility: HOSPITAL | Age: 28
End: 2023-06-02

## 2023-06-02 DIAGNOSIS — R22.1 NECK MASS: Primary | ICD-10-CM

## 2023-06-02 LAB
BACTERIA BLD CULT: NORMAL
BACTERIA BLD CULT: NORMAL

## 2023-06-02 NOTE — TELEPHONE ENCOUNTER
Reached out to on-call hematology/oncology regarding patient's preliminary pathology results  Hematology/oncology at this time is recommending outpatient evaluation  Called and spoke with patient's wife, Ernesto García and informed her that there is not a need for emergent ED visit tomorrow unless patient were to develop new symptoms, larger mass, difficulty swallowing, difficulty breathing, shortness of breath, intractable pain, fevers/chills  She is in agreement with these precautions  Provided Ernesto García with the phone number for [facility] [facility] [facility] hematology/oncology and informed her that the referral has been placed by Dr Damian Britt in the system for them to follow-up with [facility]'s hematology/oncology      Ilia Gomez MD   Transitional Year PGY1  Internal Medicine Residency

## 2023-06-02 NOTE — TELEPHONE ENCOUNTER
Patient Call    Who are you speaking with? Patient    If it is not the patient, are they listed on an active communication consent form? N/A   What is the reason for this call? Patient calling in regarding his referral we received for his Hodgkins Lymphoma (dx in notes)  The patient asked what our locations were, I listed all of the locations for the patient and he stated he was not interested in traveling to any of our locations  The patient asked if we would be able to send his referral to the hospital closest to him  I advised the patient that we would not be able to do so, but that he may be able to have his PCP refer him to a closer hospital or he may be able to call and self refer himself  The patient voiced understanding  Does this require a call back? No   If a call back is required, please list best call back number N/A   If a call back is required, advise that a message will be forwarded to their care team and someone will return their call as soon as possible  Did you relay this information to the patient?  No

## 2023-06-02 NOTE — PROGRESS NOTES
Notified by hematopathology of preliminary neck biopsy result showing classic hodgkin's lymphoma  Patient was unable to be reached  Able to reach patient's spouse Britney Nino  She acknowledged this information and stated they are in the process of seeking care at [facility] but will plan to seek treatment at [place] [address] due to the delay in paperwork processing  She was advised to seek treatment with an oncologist as soon as possible and acknowledged this, states she will call the patient at work and let him know

## 2023-06-06 LAB — SCAN RESULT: NORMAL

## 2023-06-23 PROCEDURE — 88360 TUMOR IMMUNOHISTOCHEM/MANUAL: CPT | Performed by: STUDENT IN AN ORGANIZED HEALTH CARE EDUCATION/TRAINING PROGRAM

## 2023-06-23 PROCEDURE — 88341 IMHCHEM/IMCYTCHM EA ADD ANTB: CPT | Performed by: STUDENT IN AN ORGANIZED HEALTH CARE EDUCATION/TRAINING PROGRAM

## 2023-06-23 PROCEDURE — 88342 IMHCHEM/IMCYTCHM 1ST ANTB: CPT | Performed by: STUDENT IN AN ORGANIZED HEALTH CARE EDUCATION/TRAINING PROGRAM

## 2023-06-23 PROCEDURE — 88365 INSITU HYBRIDIZATION (FISH): CPT | Performed by: STUDENT IN AN ORGANIZED HEALTH CARE EDUCATION/TRAINING PROGRAM

## 2023-06-23 PROCEDURE — 88312 SPECIAL STAINS GROUP 1: CPT | Performed by: STUDENT IN AN ORGANIZED HEALTH CARE EDUCATION/TRAINING PROGRAM

## 2023-06-23 PROCEDURE — 88307 TISSUE EXAM BY PATHOLOGIST: CPT | Performed by: STUDENT IN AN ORGANIZED HEALTH CARE EDUCATION/TRAINING PROGRAM
